# Patient Record
Sex: MALE | Race: BLACK OR AFRICAN AMERICAN | Employment: OTHER | RURAL
[De-identification: names, ages, dates, MRNs, and addresses within clinical notes are randomized per-mention and may not be internally consistent; named-entity substitution may affect disease eponyms.]

---

## 2018-04-25 ENCOUNTER — OFFICE VISIT (OUTPATIENT)
Dept: FAMILY MEDICINE CLINIC | Age: 68
End: 2018-04-25

## 2018-04-25 VITALS
BODY MASS INDEX: 32.27 KG/M2 | OXYGEN SATURATION: 96 % | HEIGHT: 67 IN | WEIGHT: 205.6 LBS | HEART RATE: 78 BPM | TEMPERATURE: 97.2 F | SYSTOLIC BLOOD PRESSURE: 165 MMHG | DIASTOLIC BLOOD PRESSURE: 105 MMHG | RESPIRATION RATE: 14 BRPM

## 2018-04-25 DIAGNOSIS — Z86.11 HISTORY OF TB (TUBERCULOSIS): ICD-10-CM

## 2018-04-25 DIAGNOSIS — E78.5 HYPERLIPIDEMIA, UNSPECIFIED HYPERLIPIDEMIA TYPE: ICD-10-CM

## 2018-04-25 DIAGNOSIS — Z85.46 HISTORY OF PROSTATE CANCER: ICD-10-CM

## 2018-04-25 DIAGNOSIS — I10 ESSENTIAL HYPERTENSION: ICD-10-CM

## 2018-04-25 DIAGNOSIS — J40 BRONCHITIS: Primary | ICD-10-CM

## 2018-04-25 RX ORDER — GLUCOSAMINE SULFATE 1500 MG
POWDER IN PACKET (EA) ORAL DAILY
COMMUNITY

## 2018-04-25 RX ORDER — NEBIVOLOL 10 MG/1
TABLET ORAL DAILY
COMMUNITY
End: 2018-05-31 | Stop reason: SDUPTHER

## 2018-04-25 RX ORDER — HYDROCHLOROTHIAZIDE 12.5 MG/1
12.5 TABLET ORAL DAILY
Qty: 30 TAB | Refills: 1 | Status: SHIPPED | OUTPATIENT
Start: 2018-04-25 | End: 2018-05-31 | Stop reason: SDUPTHER

## 2018-04-25 RX ORDER — AZITHROMYCIN 250 MG/1
TABLET, FILM COATED ORAL
Qty: 6 TAB | Refills: 0 | Status: SHIPPED | OUTPATIENT
Start: 2018-04-25 | End: 2018-04-30

## 2018-04-25 RX ORDER — GUAIFENESIN 600 MG/1
600 TABLET, EXTENDED RELEASE ORAL 2 TIMES DAILY
COMMUNITY
End: 2018-05-31

## 2018-04-25 NOTE — PATIENT INSTRUCTIONS
Continue current medications  Restart HCTZ    Work on diet and exercise    Come in for fasting Lab work    Keep planned follow up visit       For cold  Home, rest, lots of fluids, vaporizer if needed. zithromax x 5 days  Chest X ray  Over the counter mucinex if needed. Follow up if worse or not better next 3-5 days.

## 2018-04-25 NOTE — PROGRESS NOTES
Chief Complaint   Patient presents with    Establish Care     cough X1 wk, chest congestion; taking mucinex     Visit Vitals    BP (!) 167/111 (BP 1 Location: Left arm, BP Patient Position: Sitting)    Pulse 78    Temp 97.2 °F (36.2 °C) (Oral)    Resp 14    Ht 5' 7\" (1.702 m)    Wt 205 lb 9.6 oz (93.3 kg)    SpO2 96%    BMI 32.2 kg/m2     Geoff Zamora LPN

## 2018-04-25 NOTE — PROGRESS NOTES
Carolee Rocha is a 75 y.o. male who presents to the office today with the following:  Chief Complaint   Patient presents with   [facility] Establish Care     cough X1 wk, chest congestion; taking mucinex       Not on File    Current Outpatient Prescriptions   Medication Sig    nebivolol (BYSTOLIC) 10 mg tablet Take  by mouth daily.  cholecalciferol (VITAMIN D3) 1,000 unit cap Take  by mouth daily.  guaiFENesin ER (MUCINEX) 600 mg ER tablet Take 600 mg by mouth two (2) times a day.  azithromycin (ZITHROMAX) 250 mg tablet Take 2 tablets today, then take 1 tablet daily    hydroCHLOROthiazide (HYDRODIURIL) 12.5 mg tablet Take 1 Tab by mouth daily. No current facility-administered medications for this visit. Past Medical History:   Diagnosis Date    Hypertension     Prostate CA ([facility] [address] 2017    Ulcer, stomach peptic, acute        Past Surgical History:   Procedure Laterality Date    HX ACL RECONSTRUCTION Left        History   Smoking Status    Never Smoker   Smokeless Tobacco    Never Used       Family History   Problem Relation Age of Onset    Heart Disease Mother     No Known Problems Father     Heart Disease Sister     Hypertension Sister     Cancer Brother     Hypertension Brother     Heart Disease Sister     Heart Disease Sister     Hypertension Brother     Heart Disease Brother     Hypertension Brother          History of Present Illness:  Patient here for evaluation of cough and to establish in this practice    Patient states over the last week he has had URI complaints. Initially nasal congestion and postnasal drip with clear rhinorrhea. Now cough and some chest congestion. He has been taking some Mucinex DM. He has had no fever no chills no shortness of breath. He has noted some occasional wheezing. He does not smoke. No history of COPD. Patient does have a history of tuberculosis. He was treated initially in the 76s after being in [facility].  He had recurrence in 2005.   He was hospitalized at that time. He follow with pulmonology. He was treated and was felt to be cured. He has not had any ongoing respiratory issues. He did have an inhaler to be used at one point in time years ago but has never needed. Patient does have a history of hypertension. He continues with his Bystolic. Blood pressure was elevated today. He states previously it was in the 130/90 range. He was on hydrochlorothiazide in the past but stopped this on his own. He would be willing to restart this. He denies any other cardiac history. No MI CAD CHF A. fib. He has no cardiac complaints. He is going to have some screening lab work. Patient believes he has hyperlipidemia but is currently not being treated. He is willing to have some lab work    No history of diabetes. Patient does have elevated BMI. He is aware of the importance of diet and exercise for weight loss. He has DJD and has had knee surgery in the past.  He does walk with a cane    Patient has a history of prostate cancer. This is been treated with radiation. He did see his urologist prior to moving here in December 2017 lab work at that time apparently was normal.  No evidence of recurrence. Patient states his past medical history otherwise negative      Review of Systems:    Review of systems negative except as noted above      Physical Exam:  Visit Vitals    BP (!) 165/105    Pulse 78    Temp 97.2 °F (36.2 °C) (Oral)    Resp 14    Ht 5' 7\" (1.702 m)    Wt 205 lb 9.6 oz (93.3 kg)    SpO2 96%    BMI 32.2 kg/m2     Vitals:    04/25/18 1340 04/25/18 1428   BP: (!) 167/111 (!) 165/105   BP 1 Location: Left arm    BP Patient Position: Sitting    Pulse: 78    Resp: 14    Temp: 97.2 °F (36.2 °C)    TempSrc: Oral    SpO2: 96%    Weight: 205 lb 9.6 oz (93.3 kg)    Height: 5' 7\" (1.702 m)      Patient no acute distress vitals as above on repeat.   O2 sat was normal afebrile  Head was normocephalic  External ears were normal. Ear canals normal.  TMs were clear  Nose external nose normal.  No lesions. Plus   congestion with clear white rhinorrhea  OP Mucosa normal.  Pharynx normal.  No erythema or exudate. Structures midline  Neck no nodes no masses  Chest is clear no wheezes rhonchi or rales. Good air exchange. Patient did have an intermittent harsh cough  Cor regular rate and rhythm no murmurs  No edema    Assessment/Plan:  1. Bronchitis  Given persistence of symptoms I will treat patient with Zithromax. Given his history of TB checking a chest x-ray on him. He is in a follow-up getting worse or not better over the next several days. He may continue with Mucinex DM  - XR CHEST PA LAT; Future  - azithromycin (ZITHROMAX) 250 mg tablet; Take 2 tablets today, then take 1 tablet daily  Dispense: 6 Tab; Refill: 0    2. History of TB (tuberculosis)    - XR CHEST PA LAT; Future    3. Essential hypertension  Blood pressure significantly elevated today. He did forget to take his Bystolic this morning but his pressures are high enough I think we need to restart his hydrochlorothiazide. Bringing him back for fasting lab work. I am bringing him back in 3 weeks for recheck and review of the above  - CBC WITH AUTOMATED DIFF; Future  - METABOLIC PANEL, COMPREHENSIVE; Future  - TSH 3RD GENERATION; Future  - URINALYSIS W/ RFLX MICROSCOPIC; Future  - hydroCHLOROthiazide (HYDRODIURIL) 12.5 mg tablet; Take 1 Tab by mouth daily. Dispense: 30 Tab; Refill: 1    4. Hyperlipidemia, unspecified hyperlipidemia type  Checking lab work  - LIPID PANEL; Future    5. BMI 32.0-32.9,adult  Importance of diet and exercise stressed    6. History of prostate cancer  Post radiation in remission per patient    Patient Instructions   Continue current medications  Restart HCTZ    Work on diet and exercise    Come in for fasting Lab work    Keep planned follow up visit       For cold  Home, rest, lots of fluids, vaporizer if needed.   zithromax x 5 days  Chest X ray  Over the counter mucinex if needed. Follow up if worse or not better next 3-5 days. Continue current therapy plan except for indicated above. Verbal and written instructions (see AVS) provided.  Patient expresses understanding of diagnosis and treatment plan. Follow-up Disposition:  Return in about 3 weeks (around 5/16/2018). Gretchen Lipscomb.  Racheal Rodríguez MD

## 2018-04-25 NOTE — MR AVS SNAPSHOT
[facility] [address] 
[phone] Patient: Merline Mary MRN: [id number] :1950 Visit Information Date & Time Provider Department Dept. Phone Encounter #  
 2018  1:00 PM Livan Wesley  [facility] [phone] [id number] Follow-up Instructions Return in about 3 weeks (around 2018). Your Appointments 2018  3:20 PM  
Any with Livan Wesley MD  
[facility]) Appt Note: NP/Est Pcp/CPE  
 [phone] [phone]  
[phone]  
  
   
 [address] [address] Upcoming Health Maintenance Date Due Hepatitis C Screening 1950 DTaP/Tdap/Td series (1 - Tdap) 1970 FOBT Q 1 YEAR AGE 50-75 1999 ZOSTER VACCINE AGE 60> 2009 GLAUCOMA SCREENING Q2Y 2014 Pneumococcal 65+ Low/Medium Risk (1 of 2 - PCV13) 2014 Influenza Age 5 to Adult 2017 MEDICARE YEARLY EXAM 2018 Allergies as of 2018  Review Complete On: 2018 By: Livan Wesley MD  
 Not on File Current Immunizations  Never Reviewed No immunizations on file. Not reviewed this visit You Were Diagnosed With   
  
 Codes Comments Bronchitis    -  Primary ICD-10-CM: J72 ICD-9-CM: 220 History of TB (tuberculosis)     ICD-10-CM: Z86.11 
ICD-9-CM: V12.01 Essential hypertension     ICD-10-CM: I10 
ICD-9-CM: 401.9 Hyperlipidemia, unspecified hyperlipidemia type     ICD-10-CM: E78.5 ICD-9-CM: 272.4 BMI 32.0-32.9,adult     ICD-10-CM: H37.57 
ICD-9-CM: V85.32 History of prostate cancer     ICD-10-CM: Z85.46 
ICD-9-CM: V10.46 Vitals BP Pulse Temp Resp Height(growth percentile) Weight(growth percentile)  (!) 167/111 (BP 1 Location: Left arm, BP Patient Position: Sitting) 78 97.2 °F (36.2 °C) (Oral) 14 5' 7\" (1.702 m) 205 lb 9.6 oz (93.3 kg) SpO2 BMI Smoking Status 96% 32.2 kg/m2 Never Smoker BMI and BSA Data Body Mass Index Body Surface Area  
 32.2 kg/m 2 2.1 m 2 Preferred Pharmacy Pharmacy Name Phone [address] [place] [address],[address] [place] [address]  [phone] Your Updated Medication List  
  
   
This list is accurate as of 4/25/18  2:26 PM.  Always use your most recent med list.  
  
  
  
  
 azithromycin 250 mg tablet Commonly known as:  Graciela Cookey Take 2 tablets today, then take 1 tablet daily BYSTOLIC 10 mg tablet Generic drug:  nebivolol Take  by mouth daily. hydroCHLOROthiazide 12.5 mg tablet Commonly known as:  HYDRODIURIL Take 1 Tab by mouth daily. MUCINEX 600 mg ER tablet Generic drug:  guaiFENesin ER Take 600 mg by mouth two (2) times a day. VITAMIN D3 1,000 unit Cap Generic drug:  cholecalciferol Take  by mouth daily. Prescriptions Sent to Pharmacy Refills  
 azithromycin (ZITHROMAX) 250 mg tablet 0 Sig: Take 2 tablets today, then take 1 tablet daily Class: Normal  
 Pharmacy: [address] [place] [address] Dr Ph #: [phone]  
 hydroCHLOROthiazide (HYDRODIURIL) 12.5 mg tablet 1 Sig: Take 1 Tab by mouth daily. Class: Normal  
 Pharmacy: [address] [place] [address] Dr Ph #: [phone] Route: Oral  
  
Follow-up Instructions Return in about 3 weeks (around 5/16/2018). To-Do List   
 05/02/2018 Imaging:  XR CHEST PA LAT   
  
 05/25/2018 Lab:  CBC WITH AUTOMATED DIFF   
  
 05/25/2018 Lab:  LIPID PANEL   
  
 05/25/2018 Lab:  METABOLIC PANEL, COMPREHENSIVE   
  
 05/25/2018 Lab:  TSH 3RD GENERATION   
  
 05/25/2018 Lab:  URINALYSIS W/ RFLX MICROSCOPIC Patient Instructions Continue current medications Restart HCTZ Work on diet and exercise Come in for fasting Lab work Keep planned follow up visit For cold Home, rest, lots of fluids, vaporizer if needed. zithromax x 5 days Chest X ray Over the counter mucinex if needed. Follow up if worse or not better next 3-5 days. Introducing [facility]! [facility] introduces [employer] patient portal. Now you can access parts of your medical record, email your doctor's office, and request medication refills online. 1. In your internet browser, go to [email or web address] 2. Click on the First Time User? Click Here link in the Sign In box. You will see the New Member Sign Up page. 3. Enter your [employer] Access Code exactly as it appears below. You will not need to use this code after youve completed the sign-up process. If you do not sign up before the expiration date, you must request a new code. · [employer] Access Code: [email or web address] Expires: 7/24/2018  1:16 PM 
 
4. Enter the last four digits of your Social Security Number (xxxx) and Date of Birth (mm/dd/yyyy) as indicated and click Submit. You will be taken to the next sign-up page. 5. Create a [employer] ID. This will be your [employer] login ID and cannot be changed, so think of one that is secure and easy to remember. 6. Create a [employer] password. You can change your password at any time. 7. Enter your Password Reset Question and Answer. This can be used at a later time if you forget your password. 8. Enter your e-mail address. You will receive e-mail notification when new information is available in [address] 9. Click Sign Up. You can now view and download portions of your medical record. 10. Click the Download Summary menu link to download a portable copy of your medical information. If you have questions, please visit the Frequently Asked Questions section of the [employer] website. Remember, [employer] is NOT to be used for urgent needs. For medical emergencies, dial 911. Now available from your iPhone and Android! Please provide this summary of care documentation to your next provider. If you have any questions after today's visit, please call [phone].

## 2018-05-08 DIAGNOSIS — Z86.11 HISTORY OF TB (TUBERCULOSIS): ICD-10-CM

## 2018-05-08 DIAGNOSIS — J40 BRONCHITIS: ICD-10-CM

## 2018-05-15 ENCOUNTER — CLINICAL SUPPORT (OUTPATIENT)
Dept: FAMILY MEDICINE CLINIC | Age: 68
End: 2018-05-15

## 2018-05-15 DIAGNOSIS — I10 ESSENTIAL HYPERTENSION: ICD-10-CM

## 2018-05-15 DIAGNOSIS — E78.5 HYPERLIPIDEMIA, UNSPECIFIED HYPERLIPIDEMIA TYPE: ICD-10-CM

## 2018-05-16 LAB
ALBUMIN SERPL-MCNC: 3.9 G/DL (ref 3.6–4.8)
ALBUMIN/GLOB SERPL: 1.4 {RATIO} (ref 1.2–2.2)
ALP SERPL-CCNC: 69 IU/L (ref 39–117)
ALT SERPL-CCNC: 17 IU/L (ref 0–44)
APPEARANCE UR: CLEAR
AST SERPL-CCNC: 16 IU/L (ref 0–40)
BASOPHILS # BLD AUTO: 0 X10E3/UL (ref 0–0.2)
BASOPHILS NFR BLD AUTO: 0 %
BILIRUB SERPL-MCNC: 0.4 MG/DL (ref 0–1.2)
BILIRUB UR QL STRIP: NEGATIVE
BUN SERPL-MCNC: 14 MG/DL (ref 8–27)
BUN/CREAT SERPL: 14 (ref 10–24)
CALCIUM SERPL-MCNC: 9 MG/DL (ref 8.6–10.2)
CHLORIDE SERPL-SCNC: 102 MMOL/L (ref 96–106)
CHOLEST SERPL-MCNC: 205 MG/DL (ref 100–199)
CO2 SERPL-SCNC: 23 MMOL/L (ref 18–29)
COLOR UR: YELLOW
CREAT SERPL-MCNC: 0.99 MG/DL (ref 0.76–1.27)
EOSINOPHIL # BLD AUTO: 0.1 X10E3/UL (ref 0–0.4)
EOSINOPHIL NFR BLD AUTO: 2 %
ERYTHROCYTE [DISTWIDTH] IN BLOOD BY AUTOMATED COUNT: 15 % (ref 12.3–15.4)
GFR SERPLBLD CREATININE-BSD FMLA CKD-EPI: 78 ML/MIN/1.73
GFR SERPLBLD CREATININE-BSD FMLA CKD-EPI: 90 ML/MIN/1.73
GLOBULIN SER CALC-MCNC: 2.7 G/DL (ref 1.5–4.5)
GLUCOSE SERPL-MCNC: 104 MG/DL (ref 65–99)
GLUCOSE UR QL: NEGATIVE
HCT VFR BLD AUTO: 40 % (ref 37.5–51)
HDLC SERPL-MCNC: 55 MG/DL
HGB BLD-MCNC: 13.6 G/DL (ref 13–17.7)
HGB UR QL STRIP: NEGATIVE
IMM GRANULOCYTES # BLD: 0 X10E3/UL (ref 0–0.1)
IMM GRANULOCYTES NFR BLD: 0 %
INTERPRETATION, 910389: NORMAL
KETONES UR QL STRIP: NEGATIVE
LDLC SERPL CALC-MCNC: 131 MG/DL (ref 0–99)
LEUKOCYTE ESTERASE UR QL STRIP: NEGATIVE
LYMPHOCYTES # BLD AUTO: 1.4 X10E3/UL (ref 0.7–3.1)
LYMPHOCYTES NFR BLD AUTO: 34 %
MCH RBC QN AUTO: 27.5 PG (ref 26.6–33)
MCHC RBC AUTO-ENTMCNC: 34 G/DL (ref 31.5–35.7)
MCV RBC AUTO: 81 FL (ref 79–97)
MICRO URNS: NORMAL
MONOCYTES # BLD AUTO: 0.4 X10E3/UL (ref 0.1–0.9)
MONOCYTES NFR BLD AUTO: 9 %
NEUTROPHILS # BLD AUTO: 2.2 X10E3/UL (ref 1.4–7)
NEUTROPHILS NFR BLD AUTO: 55 %
NITRITE UR QL STRIP: NEGATIVE
PH UR STRIP: 6 [PH] (ref 5–7.5)
PLATELET # BLD AUTO: 204 X10E3/UL (ref 150–379)
POTASSIUM SERPL-SCNC: 3.8 MMOL/L (ref 3.5–5.2)
PROT SERPL-MCNC: 6.6 G/DL (ref 6–8.5)
PROT UR QL STRIP: NEGATIVE
RBC # BLD AUTO: 4.94 X10E6/UL (ref 4.14–5.8)
SODIUM SERPL-SCNC: 140 MMOL/L (ref 134–144)
SP GR UR: 1.02 (ref 1–1.03)
TRIGL SERPL-MCNC: 95 MG/DL (ref 0–149)
TSH SERPL DL<=0.005 MIU/L-ACNC: 2.55 UIU/ML (ref 0.45–4.5)
UROBILINOGEN UR STRIP-MCNC: 1 MG/DL (ref 0.2–1)
VLDLC SERPL CALC-MCNC: 19 MG/DL (ref 5–40)
WBC # BLD AUTO: 4.1 X10E3/UL (ref 3.4–10.8)

## 2018-05-16 NOTE — PROGRESS NOTES
Labs reviewed.   Please notify patient   CBC, chemistry panel, thyroid functions and urinalysis all normal  Lipids mildly elevated with an LDL of 131  Patient should continue his current medications  Work on a low-cholesterol diet  He should keep plan follow-up with me the end of the month for recheck of his blood pressure and to discuss his lipids and treatment options

## 2018-05-31 ENCOUNTER — OFFICE VISIT (OUTPATIENT)
Dept: FAMILY MEDICINE CLINIC | Age: 68
End: 2018-05-31

## 2018-05-31 VITALS
TEMPERATURE: 97.8 F | RESPIRATION RATE: 16 BRPM | SYSTOLIC BLOOD PRESSURE: 144 MMHG | HEART RATE: 69 BPM | DIASTOLIC BLOOD PRESSURE: 86 MMHG | WEIGHT: 208.4 LBS | HEIGHT: 67 IN | BODY MASS INDEX: 32.71 KG/M2 | OXYGEN SATURATION: 99 %

## 2018-05-31 DIAGNOSIS — Z23 ENCOUNTER FOR IMMUNIZATION: ICD-10-CM

## 2018-05-31 DIAGNOSIS — K21.9 GASTROESOPHAGEAL REFLUX DISEASE, ESOPHAGITIS PRESENCE NOT SPECIFIED: ICD-10-CM

## 2018-05-31 DIAGNOSIS — Z98.890 HISTORY OF LEFT KNEE SURGERY: ICD-10-CM

## 2018-05-31 DIAGNOSIS — E78.5 HYPERLIPIDEMIA, UNSPECIFIED HYPERLIPIDEMIA TYPE: ICD-10-CM

## 2018-05-31 DIAGNOSIS — I10 ESSENTIAL HYPERTENSION: Primary | ICD-10-CM

## 2018-05-31 RX ORDER — RANITIDINE 150 MG/1
TABLET, FILM COATED ORAL
Qty: 30 TAB | Refills: 5 | Status: SHIPPED | OUTPATIENT
Start: 2018-05-31 | End: 2018-08-30 | Stop reason: DRUGHIGH

## 2018-05-31 RX ORDER — HYDROCHLOROTHIAZIDE 12.5 MG/1
12.5 TABLET ORAL DAILY
Qty: 30 TAB | Refills: 5 | Status: SHIPPED | OUTPATIENT
Start: 2018-05-31 | End: 2018-08-30 | Stop reason: DRUGHIGH

## 2018-05-31 RX ORDER — NEBIVOLOL 10 MG/1
10 TABLET ORAL DAILY
Qty: 30 TAB | Refills: 5 | Status: SHIPPED | OUTPATIENT
Start: 2018-05-31 | End: 2018-08-30 | Stop reason: SDUPTHER

## 2018-05-31 RX ORDER — ATORVASTATIN CALCIUM 10 MG/1
10 TABLET, FILM COATED ORAL DAILY
Qty: 30 TAB | Refills: 5 | Status: SHIPPED | OUTPATIENT
Start: 2018-05-31 | End: 2018-08-23 | Stop reason: SDUPTHER

## 2018-05-31 NOTE — PATIENT INSTRUCTIONS
Continue current medications add   Zantac 1 in the morning  Add atorvastatin one in the morning  May still use Tums if needed      Work on diet and exercise    Keep planned follow up visit      Learning About High Cholesterol  What is high cholesterol? Cholesterol is a type of fat in your blood. It is needed for many body functions, such as making new cells. Cholesterol is made by your body. It also comes from food you eat. If you have too much cholesterol, it starts to build up in your arteries. This is called hardening of the arteries, or atherosclerosis. High cholesterol raises your risk of a heart attack and stroke. There are different types of cholesterol. LDL is the \"bad\" cholesterol. High LDL can raise your risk for heart disease, heart attack, and stroke. HDL is the \"good\" cholesterol. High HDL is linked with a lower risk for heart disease, heart attack, and stroke. Your cholesterol levels help your doctor find out your risk for having a heart attack or stroke. How can you prevent high cholesterol? A heart-healthy lifestyle can help you prevent high cholesterol. This lifestyle helps lower your risk for a heart attack and stroke. · Eat heart-healthy foods. ¨ Eat fruits, vegetables, whole grains (like oatmeal), dried beans and peas, nuts and seeds, soy products (like tofu), and fat-free or low-fat dairy products. ¨ Replace butter, margarine, and hydrogenated or partially hydrogenated oils with olive and canola oils. (Canola oil margarine without trans fat is fine.)  ¨ Replace red meat with fish, poultry, and soy protein (like tofu). ¨ Limit processed and packaged foods like chips, crackers, and cookies. · Be active. Exercise can improve your cholesterol level. Get at least 30 minutes of exercise on most days of the week. Walking is a good choice. You also may want to do other activities, such as running, swimming, cycling, or playing tennis or team sports. · Stay at a healthy weight.  Lose weight if you need to. · Don't smoke. If you need help quitting, talk to your doctor about stop-smoking programs and medicines. These can increase your chances of quitting for good. How is high cholesterol treated? The goal of treatment is to reduce your chances of having a heart attack or stroke. The goal is not to lower your cholesterol numbers only. · You may make lifestyle changes, such as eating healthy foods, not smoking, losing weight, and being more active. · You may have to take medicine. Follow-up care is a key part of your treatment and safety. Be sure to make and go to all appointments, and call your doctor if you are having problems. It's also a good idea to know your test results and keep a list of the medicines you take. Where can you learn more? Go to http://lin-sandra.info/. Enter Z403 in the search box to learn more about \"Learning About High Cholesterol. \"  Current as of: September 21, 2016  Content Version: 11.4  © 8095-7645 [employer]. Care instructions adapted under license by [employer] (which disclaims liability or warranty for this information). If you have questions about a medical condition or this instruction, always ask your healthcare professional. [place] [address] any warranty or liability for your use of this information.

## 2018-05-31 NOTE — MR AVS SNAPSHOT
[facility] 
[phone] Patient: James Steward MRN: [id number] :1950 Visit Information Date & Time Provider Department Dept. Phone Encounter #  
 2018  7:20 AM Michael Dexter MD [address] [place] [address] [phone] [id number] Follow-up Instructions Return in about 3 months (around 2018). Upcoming Health Maintenance Date Due Hepatitis C Screening 1950 DTaP/Tdap/Td series (1 - Tdap) 1970 ZOSTER VACCINE AGE 60> 2009 GLAUCOMA SCREENING Q2Y 2014 Pneumococcal 65+ Low/Medium Risk (1 of 2 - PCV13) 2014 MEDICARE YEARLY EXAM 2018 Influenza Age 5 to Adult 2018 COLONOSCOPY 2024 Allergies as of 2018  Review Complete On: 2018 By: Michael Dexter MD  
 No Known Allergies Current Immunizations  Never Reviewed Name Date Pneumococcal Polysaccharide (PPSV-23)  Incomplete Not reviewed this visit You Were Diagnosed With   
  
 Codes Comments Essential hypertension    -  Primary ICD-10-CM: I10 
ICD-9-CM: 401.9 Hyperlipidemia, unspecified hyperlipidemia type     ICD-10-CM: E78.5 ICD-9-CM: 272.4 BMI 32.0-32.9,adult     ICD-10-CM: I50.37 
ICD-9-CM: V85.32 Gastroesophageal reflux disease, esophagitis presence not specified     ICD-10-CM: K21.9 ICD-9-CM: 530.81 History of left knee surgery     ICD-10-CM: Z98.890 ICD-9-CM: V15.29 Encounter for immunization     ICD-10-CM: C47 ICD-9-CM: V03.89 Vitals BP Pulse Temp Resp Height(growth percentile) Weight(growth percentile) 144/86 (BP 1 Location: Left arm, BP Patient Position: Sitting) 69 97.8 °F (36.6 °C) (Oral) 16 5' 7\" (1.702 m) 208 lb 6.4 oz (94.5 kg) SpO2 BMI Smoking Status 99% 32.64 kg/m2 Never Smoker Vitals History BMI and BSA Data Body Mass Index Body Surface Area 32.64 kg/m 2 2.11 m 2 Preferred Pharmacy Pharmacy Name Phone [facility] Merna Quinn [phone] Your Updated Medication List  
  
   
This list is accurate as of 18  8:31 AM.  Always use your most recent med list.  
  
  
  
  
 atorvastatin 10 mg tablet Commonly known as:  LIPITOR Take 1 Tab by mouth daily. hydroCHLOROthiazide 12.5 mg tablet Commonly known as:  HYDRODIURIL Take 1 Tab by mouth daily. nebivolol 10 mg tablet Commonly known as:  BYSTOLIC Take 1 Tab by mouth daily. raNITIdine 150 mg tablet Commonly known as:  ZANTAC  
1 tablet daily VITAMIN D3 1,000 unit Cap Generic drug:  cholecalciferol Take  by mouth daily. Prescriptions Sent to Pharmacy Refills  
 nebivolol (BYSTOLIC) 10 mg tablet 5 Sig: Take 1 Tab by mouth daily. Class: Normal  
 Pharmacy: [facility] DR SANTIAGO DAVIDSON [facility], [facility] Pawan Brenner Ph #: [phone] Route: Oral  
 hydroCHLOROthiazide (HYDRODIURIL) 12.5 mg tablet 5 Sig: Take 1 Tab by mouth daily. Class: Normal  
 Pharmacy: [facility] DR SANTIAGO DAVIDSON [facility], [facility] Pawan Brenner Ph #: [phone] Route: Oral  
 raNITIdine (ZANTAC) 150 mg tablet 5 Si tablet daily Class: Normal  
 [facility] [phone]  
 atorvastatin (LIPITOR) 10 mg tablet 5 Sig: Take 1 Tab by mouth daily. Class: Normal  
 Pharmacy: [facility] DR SANTIAGO DAVIDSON [facility], [address] [place], [place] [address]jennifer Brenner Ph #: [phone] Route: Oral  
  
We Performed the Following AMB POC EKG ROUTINE  LEADS, INTER & REP [72024 CPT(R)] PNEUMOCOCCAL POLYSACCHARIDE VACCINE, 23-VALENT, ADULT OR IMMUNOSUPPRESSED PT DOSE, [39908 CPT(R)] Follow-up Instructions Return in about 3 months (around 2018). Patient Instructions Continue current medications add  
Zantac 1 in the morning Add atorvastatin one in the morning May still use Tums if needed Work on diet and exercise Keep planned follow up visit Learning About High Cholesterol What is high cholesterol? Cholesterol is a type of fat in your blood. It is needed for many body functions, such as making new cells. Cholesterol is made by your body. It also comes from food you eat. If you have too much cholesterol, it starts to build up in your arteries. This is called hardening of the arteries, or atherosclerosis. High cholesterol raises your risk of a heart attack and stroke. There are different types of cholesterol. LDL is the \"bad\" cholesterol. High LDL can raise your risk for heart disease, heart attack, and stroke. HDL is the \"good\" cholesterol. High HDL is linked with a lower risk for heart disease, heart attack, and stroke. Your cholesterol levels help your doctor find out your risk for having a heart attack or stroke. How can you prevent high cholesterol? A heart-healthy lifestyle can help you prevent high cholesterol. This lifestyle helps lower your risk for a heart attack and stroke. · Eat heart-healthy foods. ¨ Eat fruits, vegetables, whole grains (like oatmeal), dried beans and peas, nuts and seeds, soy products (like tofu), and fat-free or low-fat dairy products. ¨ Replace butter, margarine, and hydrogenated or partially hydrogenated oils with olive and canola oils. (Canola oil margarine without trans fat is fine.) ¨ Replace red meat with fish, poultry, and soy protein (like tofu). ¨ Limit processed and packaged foods like chips, crackers, and cookies. · Be active. Exercise can improve your cholesterol level. Get at least 30 minutes of exercise on most days of the week. Walking is a good choice. You also may want to do other activities, such as running, swimming, cycling, or playing tennis or team sports. · Stay at a healthy weight. Lose weight if you need to. · Don't smoke. If you need help quitting, talk to your doctor about stop-smoking programs and medicines. These can increase your chances of quitting for good. How is high cholesterol treated? The goal of treatment is to reduce your chances of having a heart attack or stroke. The goal is not to lower your cholesterol numbers only. · You may make lifestyle changes, such as eating healthy foods, not smoking, losing weight, and being more active. · You may have to take medicine. Follow-up care is a key part of your treatment and safety. Be sure to make and go to all appointments, and call your doctor if you are having problems. It's also a good idea to know your test results and keep a list of the medicines you take. Where can you learn more? Go to http://lin-sandra.info/. Enter R781 in the search box to learn more about \"Learning About High Cholesterol. \" Current as of: September 21, 2016 Content Version: 11.4 © 4465-1720 10X10 Room. Care instructions adapted under license by [employer] (which disclaims liability or warranty for this information). If you have questions about a medical condition or this instruction, always ask your healthcare professional. [address] any warranty or liability for your use of this information. Introducing [facility] & HEALTH SERVICES! [facility] introduces Member Desk patient portal. Now you can access parts of your medical record, email your doctor's office, and request medication refills online. 1. In your internet browser, go to [email or web address] 2. Click on the First Time User? Click Here link in the Sign In box. You will see the New Member Sign Up page. 3. Enter your Member Desk Access Code exactly as it appears below. You will not need to use this code after youve completed the sign-up process. If you do not sign up before the expiration date, you must request a new code. · [employer] Access Code: [email or web address] Expires: 7/24/2018  1:16 PM 
 
4. Enter the last four digits of your Social Security Number (xxxx) and Date of Birth (mm/dd/yyyy) as indicated and click Submit. You will be taken to the next sign-up page. 5. Create a [employer] ID. This will be your [employer] login ID and cannot be changed, so think of one that is secure and easy to remember. 6. Create a [employer] password. You can change your password at any time. 7. Enter your Password Reset Question and Answer. This can be used at a later time if you forget your password. 8. Enter your e-mail address. You will receive e-mail notification when new information is available in [address] 9. Click Sign Up. You can now view and download portions of your medical record. 10. Click the Download Summary menu link to download a portable copy of your medical information. If you have questions, please visit the Frequently Asked Questions section of the [employer] website. Remember, [employer] is NOT to be used for urgent needs. For medical emergencies, dial 911. Now available from your iPhone and Android! Please provide this summary of care documentation to your next provider. If you have any questions after today's visit, please call [phone].

## 2018-05-31 NOTE — PROGRESS NOTES
Otto Owens is a 75 y.o. male who presents to the office today with the following:  Chief Complaint   Patient presents with    Hypertension     f/u       No Known Allergies    Current Outpatient Prescriptions   Medication Sig    nebivolol (BYSTOLIC) 10 mg tablet Take 1 Tab by mouth daily.  hydroCHLOROthiazide (HYDRODIURIL) 12.5 mg tablet Take 1 Tab by mouth daily.  raNITIdine (ZANTAC) 150 mg tablet 1 tablet daily    atorvastatin (LIPITOR) 10 mg tablet Take 1 Tab by mouth daily.  cholecalciferol (VITAMIN D3) 1,000 unit cap Take  by mouth daily. No current facility-administered medications for this visit. Past Medical History:   Diagnosis Date    Hypertension     Prostate CA ([address] 2017    Ulcer, stomach peptic, acute        Past Surgical History:   Procedure Laterality Date    HX ACL RECONSTRUCTION Left        History   Smoking Status    Never Smoker   Smokeless Tobacco    Never Used       Family History   Problem Relation Age of Onset    Heart Disease Mother     No Known Problems Father     Heart Disease Sister     Hypertension Sister     Cancer Brother      prostate    Hypertension Brother     Heart Disease Sister     Heart Disease Sister     Hypertension Brother     Heart Disease Brother     Hypertension Brother          History of Present Illness:  Patient here for follow-up from visit 4/25 and ongoing medical follow-up    I saw the patient last month with complaints of cough and congestion. Chest x-ray did show a mild patchy right upper lobe infiltrate concerning for pneumonia. He was treated with Zithromax. Symptoms resolved. He has no further respiratory complaints    Patient does have a history of tuberculosis. He was treated initially in the 76s after being in [facility].  He had recurrence in 2005. He was hospitalized at that time. He follow with pulmonology. He was treated and was felt to be cured. He has not had any ongoing respiratory issues.   He did have an inhaler to be used at one point in time years ago but has never needed. Patient does have a history of hypertension. He continues with his Bystolic. Blood pressure was elevated at the last visit and I did add in low-dose hydrochlorothiazide. He is tolerating it well. Blood pressure is improved today. He denies any other cardiac history. No MI CAD CHF A. fib. He has no cardiac complaints. He has had a history of peptic ulcer disease and ongoing GERD complaints so not a good candidate for aspirin at this point. Recent lab work reveals normal basic metabolic profile thyroid functions urinalysis and CBC. Patient states his previous physician did have him seen by cardiology. He has had a negative stress test in the past.  He just saw cardiology within the last year and a felt things were stable and made no changes in his regimen. Patient does have hyperlipidemia with an LDL of 131. Given his 10 year cardiac risk statin therapy is recommended. Risks and benefits of atorvastatin discussed with patient and he is willing to begin this today. I have given him a low-cholesterol diet    No history of diabetes. Random BG normal    Patient does have elevated BMI. He is aware of the importance of diet and exercise for weight loss. He has DJD and a history of meniscal injury and ACL injury left knee. He has had 2 knee surgeries in the past.  These were complicated. He does continue to use a cane and walk with a limp. He does continue to follow with orthopedist.  He recently had another cortisone shot    Patient has a history of peptic ulcer disease in the past.  He was treated at that time. He does have some ongoing reflux symptoms mostly after he eats certain food or drink soda. No exertional symptoms. He used to take Zantac. Now he is taking Tums which she uses on a daily basis. He is willing to restart his Zantac    Patient has a history of prostate cancer.   This is been treated with radiation. He did see his urologist prior to moving here in December 2017 lab work at that time apparently was normal.  No evidence of recurrence. Patient states his past medical history otherwise negative    Patient states he had a negative colonoscopy in 2014 and was told to go 10 years for his next scope    Patient has had the Prevnar in the past.  He was given the Pneumovax 23 today. I did recommend the shingles vaccine at his pharmacy. He will come in if he injures himself for a tetanus booster      Review of Systems:    Review of systems negative except as noted above      Physical Exam:  Visit Vitals    /86 (BP 1 Location: Left arm, BP Patient Position: Sitting)    Pulse 69    Temp 97.8 °F (36.6 °C) (Oral)    Resp 16    Ht 5' 7\" (1.702 m)    Wt 208 lb 6.4 oz (94.5 kg)    SpO2 99%    BMI 32.64 kg/m2     Vitals:    05/31/18 0712   BP: 144/86   BP 1 Location: Left arm   BP Patient Position: Sitting   Pulse: 69   Resp: 16   Temp: 97.8 °F (36.6 °C)   TempSrc: Oral   SpO2: 99%   Weight: 208 lb 6.4 oz (94.5 kg)   Height: 5' 7\" (1.702 m)     Patient no acute distress vital signs stable as above on repeat blood pressure was 140/90  Head is normocephalic  External ears normal..    Eyes PERRLA. EOMs full. Sclera clear Patient states it has been 2 years since he saw his eye doctor and is scheduling this appointment  Nose within normal limits. Nares  normal.  No significant congestion  OP mucosa normal, no obvious lesions. Pharynx normal.  No erythema or exudate. Structures midline. Neck no nodes no masses no bruits  Chest was clear no wheezes rhonchi or rales. Good air exchange  Cor regular rate and rhythm no S3-S4 no murmurs  Abdomen obese no HSM no masses soft and was nontender  Extremities no edema. Nontender. Good range of motion  Patient did walk with a limp favoring his left knee  EKG had normal sinus rhythm with some nonspecific T-wave flattening lateral leads      1.  Essential hypertension  Blood pressure improved and approaching goal.  Patients can continue his current medication. He would like to see what he can do with his diet and exercise to further reduce his systolic blood pressures. I like see him back in 3 months for follow-up. - AMB POC EKG ROUTINE W/ 12 LEADS, INTER & REP  - nebivolol (BYSTOLIC) 10 mg tablet; Take 1 Tab by mouth daily. Dispense: 30 Tab; Refill: 5  - hydroCHLOROthiazide (HYDRODIURIL) 12.5 mg tablet; Take 1 Tab by mouth daily. Dispense: 30 Tab; Refill: 5    2. Hyperlipidemia, unspecified hyperlipidemia type  Starting atorvastatin 1 daily. Patient will work on his diet. We will recheck lab work at his follow-up visit  - atorvastatin (LIPITOR) 10 mg tablet; Take 1 Tab by mouth daily. Dispense: 30 Tab; Refill: 5    3. BMI 32.0-32.9,adult  Importance of diet and exercise stressed    4. Gastroesophageal reflux disease, esophagitis presence not specified  We will start daily ranitidine  - raNITIdine (ZANTAC) 150 mg tablet; 1 tablet daily  Dispense: 30 Tab; Refill: 5    5. History of left knee surgery  Patient will continue follow-up with his orthopedist    6. Encounter for immunization    - PNEUMOCOCCAL POLYSACCHARIDE VACCINE, 23-VALENT, ADULT OR IMMUNOSUPPRESSED PT DOSE,      Patient Instructions   Continue current medications add   Zantac 1 in the morning  Add atorvastatin one in the morning  May still use Tums if needed      Work on diet and exercise    Keep planned follow up visit      Learning About High Cholesterol  What is high cholesterol? Cholesterol is a type of fat in your blood. It is needed for many body functions, such as making new cells. Cholesterol is made by your body. It also comes from food you eat. If you have too much cholesterol, it starts to build up in your arteries. This is called hardening of the arteries, or atherosclerosis. High cholesterol raises your risk of a heart attack and stroke. There are different types of cholesterol. LDL is the \"bad\" cholesterol. High LDL can raise your risk for heart disease, heart attack, and stroke. HDL is the \"good\" cholesterol. High HDL is linked with a lower risk for heart disease, heart attack, and stroke. Your cholesterol levels help your doctor find out your risk for having a heart attack or stroke. How can you prevent high cholesterol? A heart-healthy lifestyle can help you prevent high cholesterol. This lifestyle helps lower your risk for a heart attack and stroke. · Eat heart-healthy foods. ¨ Eat fruits, vegetables, whole grains (like oatmeal), dried beans and peas, nuts and seeds, soy products (like tofu), and fat-free or low-fat dairy products. ¨ Replace butter, margarine, and hydrogenated or partially hydrogenated oils with olive and canola oils. (Canola oil margarine without trans fat is fine.)  ¨ Replace red meat with fish, poultry, and soy protein (like tofu). ¨ Limit processed and packaged foods like chips, crackers, and cookies. · Be active. Exercise can improve your cholesterol level. Get at least 30 minutes of exercise on most days of the week. Walking is a good choice. You also may want to do other activities, such as running, swimming, cycling, or playing tennis or team sports. · Stay at a healthy weight. Lose weight if you need to. · Don't smoke. If you need help quitting, talk to your doctor about stop-smoking programs and medicines. These can increase your chances of quitting for good. How is high cholesterol treated? The goal of treatment is to reduce your chances of having a heart attack or stroke. The goal is not to lower your cholesterol numbers only. · You may make lifestyle changes, such as eating healthy foods, not smoking, losing weight, and being more active. · You may have to take medicine. Follow-up care is a key part of your treatment and safety. Be sure to make and go to all appointments, and call your doctor if you are having problems.  It's also a good idea to know your test results and keep a list of the medicines you take. Where can you learn more? Go to http://lin-sandra.info/. Enter J662 in the search box to learn more about \"Learning About High Cholesterol. \"  Current as of: September 21, 2016  Content Version: 11.4  © 4274-0333 [employer]. Care instructions adapted under license by [employer] (which disclaims liability or warranty for this information). If you have questions about a medical condition or this instruction, always ask your healthcare professional. [place] [address] any warranty or liability for your use of this information. Continue current therapy plan except for indicated above. Verbal and written instructions (see AVS) provided.  Patient expresses understanding of diagnosis and treatment plan. Follow-up Disposition:  Return in about 3 months (around 8/31/2018). Jared Ojeda.  Victorina Jade MD

## 2018-05-31 NOTE — PROGRESS NOTES
Chief Complaint   Patient presents with    Hypertension     f/u     Health Maintenance Due   Topic Date Due    Hepatitis C Screening  1950    DTaP/Tdap/Td series (1 - Tdap) 11/12/1970    FOBT Q 1 YEAR AGE 50-75  11/12/1999    ZOSTER VACCINE AGE 60>  09/12/2009    GLAUCOMA SCREENING Q2Y  11/12/2014    Pneumococcal 65+ Low/Medium Risk (1 of 2 - PCV13) 11/12/2014    MEDICARE YEARLY EXAM  04/25/2018     Visit Vitals    /86 (BP 1 Location: Left arm, BP Patient Position: Sitting)    Pulse 69    Temp 97.8 °F (36.6 °C) (Oral)    Resp 16    Ht 5' 7\" (1.702 m)    Wt 208 lb 6.4 oz (94.5 kg)    SpO2 99%    BMI 32.64 kg/m2     Yara Arteaga, BENNETTN

## 2018-06-12 ENCOUNTER — OFFICE VISIT (OUTPATIENT)
Dept: FAMILY MEDICINE CLINIC | Age: 68
End: 2018-06-12

## 2018-06-12 VITALS
OXYGEN SATURATION: 95 % | HEIGHT: 67 IN | TEMPERATURE: 97.4 F | DIASTOLIC BLOOD PRESSURE: 83 MMHG | SYSTOLIC BLOOD PRESSURE: 131 MMHG | HEART RATE: 69 BPM | WEIGHT: 207 LBS | BODY MASS INDEX: 32.49 KG/M2 | RESPIRATION RATE: 14 BRPM

## 2018-06-12 DIAGNOSIS — S80.812A ABRASION, LEFT LOWER LEG, INITIAL ENCOUNTER: Primary | ICD-10-CM

## 2018-06-12 RX ORDER — TRAMADOL HYDROCHLORIDE 50 MG/1
50 TABLET ORAL
COMMUNITY
Start: 2018-06-03 | End: 2018-08-30

## 2018-06-12 RX ORDER — CEPHALEXIN 500 MG/1
500 CAPSULE ORAL
COMMUNITY
Start: 2018-06-03 | End: 2018-06-13

## 2018-06-12 NOTE — PATIENT INSTRUCTIONS
Continue antibiotics  Clean daily with soap and water  Leave open to air when possible  Await culture results  Keep leg elevated  Follow-up if not continuing to improve
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
## 2018-06-12 NOTE — PROGRESS NOTES
Simon Dixon is a 75 y.o. male who presents to the office today with the following:  Chief Complaint   Patient presents with    Leg Injury     f/u ER visit 06/03/2018; lower left leg injury riding lawnmower accident       No Known Allergies    Current Outpatient Prescriptions   Medication Sig    cephALEXin (KEFLEX) 500 mg capsule Take 500 mg by mouth.  traMADol (ULTRAM) 50 mg tablet Take 50 mg by mouth.  nebivolol (BYSTOLIC) 10 mg tablet Take 1 Tab by mouth daily.  hydroCHLOROthiazide (HYDRODIURIL) 12.5 mg tablet Take 1 Tab by mouth daily.  raNITIdine (ZANTAC) 150 mg tablet 1 tablet daily    atorvastatin (LIPITOR) 10 mg tablet Take 1 Tab by mouth daily.  cholecalciferol (VITAMIN D3) 1,000 unit cap Take  by mouth daily. No current facility-administered medications for this visit. Past Medical History:   Diagnosis Date    Hypercholesterolemia     Hypertension     Prostate CA ([facility] [address] 2017    Ulcer, stomach peptic, acute        Past Surgical History:   Procedure Laterality Date    HX ACL RECONSTRUCTION Left        History   Smoking Status    Never Smoker   Smokeless Tobacco    Never Used       Family History   Problem Relation Age of Onset    Heart Disease Mother     No Known Problems Father     Heart Disease Sister     Hypertension Sister     Cancer Brother      prostate    Hypertension Brother     Heart Disease Sister     Heart Disease Sister     Hypertension Brother     Heart Disease Brother     Hypertension Brother          History of Present Illness:  Patient here for ER follow-up left leg injury. Last week while riding his ride on [employer] it went forward unexpectedly and he got his left leg caught under an 18 vega. He was able to back the tractor up. He did sustain some superficial abrasions lacerations to his left anterior shin. He did go to the emergency room the next day. X-rays of the area were negative.   Patient is quite clear he had a tetanus shot within the last 5 years. The area was cleaned. He was placed on Keflex and as needed Ultram.  He did not get the Keflex for a couple of days after the ER visit. He has at least 5 days of prescription left    Patient states the swelling is better. It is still sore and stiff. No fever no chills. He admits he has not been keeping it elevated and has been doing his normal daily routine and work. He has had several injuries to his left leg in the past     Patient was in to see me recently for routine medical checkup and has a follow-up scheduled for the fall      Review of Systems:    Review of systems negative except as noted above      Physical Exam:  Visit Vitals    /83 (BP 1 Location: Left arm, BP Patient Position: Sitting)    Pulse 69    Temp 97.4 °F (36.3 °C) (Oral)    Resp 14    Ht 5' 7\" (1.702 m)    Wt 207 lb (93.9 kg)    SpO2 95%    BMI 32.42 kg/m2     Vitals:    06/12/18 1056   BP: 131/83   BP 1 Location: Left arm   BP Patient Position: Sitting   Pulse: 69   Resp: 14   Temp: 97.4 °F (36.3 °C)   TempSrc: Oral   SpO2: 95%   Weight: 207 lb (93.9 kg)   Height: 5' 7\" (1.702 m)     Patient was in no acute distress vital signs stable afebrile  Left leg. Patient had a healing flap type laceration upper anterior shin. Approximately 2 cm area open granulating in. Slight whitish exudate. Culture was done. Minimal surrounding erythema. Not warm to touch. No flocculence. Just below this he had a dry healed smaller flap type laceration. No lower extremity edema. No calf tenderness. He was able to flex and extend his foot against resistance    Assessment/Plan:  1. Abrasion, left lower leg, initial encounter  Patient with contusion abrasion and superficial laceration left leg. Slowly healing in. On antibiotics. Culture was done. I recommend he finish antibiotics. I strongly recommend he keep his leg elevated more often to speed the healing process.   Also recommending they avoid the peroxide the been using and wash it with soap and water daily. Also keep it open to air as much as possible. They will follow-up if not continuing to improve otherwise will keep her previously planned follow-up  - AEROBIC BACTERIAL CULTURE    Patient Instructions   Continue antibiotics  Clean daily with soap and water  Leave open to air when possible  Await culture results  Keep leg elevated  Follow-up if not continuing to improve          Continue current therapy plan except for indicated above. Verbal and written instructions (see AVS) provided.  Patient expresses understanding of diagnosis and treatment plan. Follow-up Disposition:  Return if symptoms worsen or fail to improve. Breanne Peoples.  Mitch Rendon MD

## 2018-06-17 LAB
BACTERIA SPEC AEROBE CULT: ABNORMAL
BACTERIA SPEC AEROBE CULT: ABNORMAL

## 2018-06-18 NOTE — PROGRESS NOTES
Wound culture showed just light growth of bacteria beyond normal skin jai  This is not of concern as long as the area continues to heal in  Please contact patient and make sure that his abrasion has continued to heal  If not he needs to schedule follow-up for me to check this again

## 2018-08-23 DIAGNOSIS — E78.5 HYPERLIPIDEMIA, UNSPECIFIED HYPERLIPIDEMIA TYPE: ICD-10-CM

## 2018-08-23 RX ORDER — ATORVASTATIN CALCIUM 10 MG/1
10 TABLET, FILM COATED ORAL DAILY
Qty: 30 TAB | Refills: 0 | Status: SHIPPED | OUTPATIENT
Start: 2018-08-23 | End: 2018-08-30 | Stop reason: SDUPTHER

## 2018-08-23 NOTE — TELEPHONE ENCOUNTER
Patient requesting refill of his atorvastatin  This was started in June  He was supposed to schedule follow-up with me in August for recheck and lab work but has not made the appointment yet  Based on this I was able to refill the medication ×1 month until he schedules the appointment and we recheck his lab work

## 2018-08-30 ENCOUNTER — OFFICE VISIT (OUTPATIENT)
Dept: FAMILY MEDICINE CLINIC | Age: 68
End: 2018-08-30

## 2018-08-30 VITALS
HEIGHT: 67 IN | DIASTOLIC BLOOD PRESSURE: 83 MMHG | TEMPERATURE: 96.3 F | BODY MASS INDEX: 32.52 KG/M2 | WEIGHT: 207.2 LBS | OXYGEN SATURATION: 96 % | SYSTOLIC BLOOD PRESSURE: 153 MMHG | RESPIRATION RATE: 18 BRPM | HEART RATE: 73 BPM

## 2018-08-30 DIAGNOSIS — K21.9 GASTROESOPHAGEAL REFLUX DISEASE, ESOPHAGITIS PRESENCE NOT SPECIFIED: ICD-10-CM

## 2018-08-30 DIAGNOSIS — Z86.11 HISTORY OF TB (TUBERCULOSIS): ICD-10-CM

## 2018-08-30 DIAGNOSIS — Z98.890 HISTORY OF LEFT KNEE SURGERY: ICD-10-CM

## 2018-08-30 DIAGNOSIS — I10 ESSENTIAL HYPERTENSION: ICD-10-CM

## 2018-08-30 DIAGNOSIS — Z85.46 HISTORY OF PROSTATE CANCER: ICD-10-CM

## 2018-08-30 DIAGNOSIS — N52.35 ERECTILE DYSFUNCTION FOLLOWING RADIATION THERAPY: ICD-10-CM

## 2018-08-30 DIAGNOSIS — E78.5 HYPERLIPIDEMIA, UNSPECIFIED HYPERLIPIDEMIA TYPE: Primary | ICD-10-CM

## 2018-08-30 DIAGNOSIS — Z23 ENCOUNTER FOR IMMUNIZATION: ICD-10-CM

## 2018-08-30 RX ORDER — NEBIVOLOL 10 MG/1
10 TABLET ORAL DAILY
Qty: 30 TAB | Refills: 5 | Status: SHIPPED | OUTPATIENT
Start: 2018-08-30 | End: 2019-10-08 | Stop reason: SDUPTHER

## 2018-08-30 RX ORDER — SILDENAFIL 50 MG/1
50 TABLET, FILM COATED ORAL AS NEEDED
Qty: 10 TAB | Refills: 1 | Status: SHIPPED | OUTPATIENT
Start: 2018-08-30 | End: 2019-03-04 | Stop reason: SDUPTHER

## 2018-08-30 RX ORDER — RANITIDINE 300 MG/1
300 TABLET ORAL DAILY
Qty: 30 TAB | Refills: 1 | Status: SHIPPED | OUTPATIENT
Start: 2018-08-30 | End: 2020-03-02

## 2018-08-30 RX ORDER — ATORVASTATIN CALCIUM 10 MG/1
10 TABLET, FILM COATED ORAL DAILY
Qty: 30 TAB | Refills: 5 | Status: SHIPPED | OUTPATIENT
Start: 2018-08-30 | End: 2018-10-30 | Stop reason: SDUPTHER

## 2018-08-30 RX ORDER — HYDROCHLOROTHIAZIDE 25 MG/1
25 TABLET ORAL DAILY
Qty: 30 TAB | Refills: 1 | Status: SHIPPED | OUTPATIENT
Start: 2018-08-30 | End: 2019-06-04 | Stop reason: DRUGHIGH

## 2018-08-30 NOTE — PROGRESS NOTES
Nikos Betts is a 75 y.o. male who presents to the office today with the following:  Chief Complaint   Patient presents with    Knee Injury     left knee injury on tractor 3 mos. ago; pt wants MRI on knee; 6-7/10; worse w/rain per pt. No Known Allergies    Current Outpatient Prescriptions   Medication Sig    nebivolol (BYSTOLIC) 10 mg tablet Take 1 Tab by mouth daily.  atorvastatin (LIPITOR) 10 mg tablet Take 1 Tab by mouth daily.  hydroCHLOROthiazide (HYDRODIURIL) 25 mg tablet Take 1 Tab by mouth daily.  raNITIdine (ZANTAC) 300 mg tablet Take 1 Tab by mouth daily.  sildenafil citrate (VIAGRA) 50 mg tablet Take 1 Tab by mouth as needed.  cholecalciferol (VITAMIN D3) 1,000 unit cap Take  by mouth daily. No current facility-administered medications for this visit. Past Medical History:   Diagnosis Date    Hypercholesterolemia     Hypertension     Prostate CA ([facility] [address] 2017    Ulcer, stomach peptic, acute        Past Surgical History:   Procedure Laterality Date    HX ACL RECONSTRUCTION Left        History   Smoking Status    Never Smoker   Smokeless Tobacco    Never Used       Family History   Problem Relation Age of Onset    Heart Disease Mother     No Known Problems Father     Heart Disease Sister     Hypertension Sister     Cancer Brother      prostate    Hypertension Brother     Heart Disease Sister     Heart Disease Sister     Hypertension Brother     Heart Disease Brother     Hypertension Brother          History of Present Illness:  Patient here for ongoing medical follow-up      Patient does have a history of tuberculosis. He was treated initially in the 76s after being in [facility].  He had recurrence in 2005. He was hospitalized at that time. He follow with pulmonology. He was treated and was felt to be cured. He has not had any ongoing respiratory issues. He did have an inhaler to be used at one point in time years ago but has never needed.   I did treat the patient for a possible right upper lobe pneumonia in May. Those symptoms have resolved. I am repeating his chest x-ray to obtain a new baseline. Patient does have a history of hypertension. He continues with his Bystolic. Blood pressure was elevated at the last spring and I did add in low-dose hydrochlorothiazide. He is tolerating it well. Blood pressure is improved at last visit but back up a bit today. I am increasing his hydrochlorothiazide. He denies any other cardiac history. No MI CAD CHF A. fib. He has no cardiac complaints. He has had a history of peptic ulcer disease and ongoing GERD complaints so not a good candidate for aspirin at this point. Recent lab work reveals normal basic metabolic profile thyroid functions urinalysis and CBC. .  Repeat labs ordered    Patient states his previous physician did have him seen by cardiology. He has had a negative stress test in the past.  He just saw cardiology in 2017 and a felt things were stable and made no changes in his regimen. Patient does have hyperlipidemia with an LDL of 131. Given his 10 year cardiac risk statin therapy is recommended. Patient is now on atorvastatin and tolerating the medication well. I have given him a low-cholesterol diet. Lab work ordered today    No history of diabetes. Random BG normal    Patient does have elevated BMI. He is aware of the importance of diet and exercise for weight loss. He has DJD and a history of meniscal injury and ACL injury left knee. He has had 2 knee surgeries in the past.  These were complicated. He does continue to use a cane and walk with a limp. He was following with his orthopedist in [place]. He had a cortisone shot in the spring. He does have ongoing pain left knee. He is interested in a follow-up MRI scan and appointment to see a new orthopedist locally for further evaluation.   He did injure that knee over the summer when he suffered abrasion after driving a lawnmower under a tractor trailer. He states he is not sure whether his knee hurts worse after that injury or not. The abrasion and infection have healed    Patient has a history of peptic ulcer disease in the past.  He was treated at that time. He does have some ongoing reflux symptoms mostly after he eats certain food or drink soda. No exertional symptoms. He previously responded to Zantac. I did start low-dose Zantac at the last visit. It is helped but he still has ongoing symptoms. Those symptoms are again relieved if he takes Tums. I am increasing the dose today. He will notify me if his stomach symptoms persist    Patient has a history of prostate cancer. This is been treated with radiation. He did see his urologist prior to moving here in December 2017 lab work at that time apparently was normal.  No evidence of recurrence. PSA ordered today    Patient does have a ED after his prostate radiation therapy. He did request a prescription for Viagra and I wrote for this today with instructions    Patient states his past medical history otherwise negative    Patient states he had a negative colonoscopy in 2014 and was told to go 10 years for his next scope    Patient has had the Prevnar in the past.  He was given the Pneumovax 23 5/18. I did recommend the shingles vaccine at his pharmacy. He will come in if he injures himself for a tetanus booster.   Flu shot given 8/18      Review of Systems:    Review of systems negative except as noted above      Physical Exam:  Visit Vitals    /83 (BP 1 Location: Right arm, BP Patient Position: Sitting)    Pulse 73    Temp 96.3 °F (35.7 °C) (Oral)    Resp 18    Ht 5' 7\" (1.702 m)    Wt 207 lb 3.2 oz (94 kg)    SpO2 96%    BMI 32.45 kg/m2     Vitals:    08/30/18 0726   BP: 153/83   BP 1 Location: Right arm   BP Patient Position: Sitting   Pulse: 73   Resp: 18   Temp: 96.3 °F (35.7 °C)   TempSrc: Oral   SpO2: 96%   Weight: 207 lb 3.2 oz (94 kg) Height: 5' 7\" (1.702 m)     Patient no acute distress vital signs stable as above on repeat blood pressure was 145/95  Head is normocephalic  External ears normal..    Eyes PERRLA. EOMs full. Sclera clear Patient states it has been 2 years since he saw his eye doctor and is scheduling this appointment  Nose within normal limits. Nares  normal.  No significant congestion  OP mucosa normal, no obvious lesions. Pharynx normal.  No erythema or exudate. Structures midline. Neck no nodes no masses no bruits  Chest was clear no wheezes rhonchi or rales. Good air exchange  Cor regular rate and rhythm no S3-S4 no murmurs  Abdomen obese no HSM no masses soft and was nontender. He did point to the mid upper abdomen is a side of his discomfort  Extremities no edema. Nontender. Good range of motion  Patient did walk with a limp favoring his left knee  Left knee had scars from previous surgery. No effusion today. Abrasion had healed. Nontender to palpation. Decreased range of motion  EKG 2018 had normal sinus rhythm with some nonspecific T-wave flattening lateral leads      1. Hyperlipidemia, unspecified hyperlipidemia type  Patient now on atorvastatin. Checking lab work  - atorvastatin (LIPITOR) 10 mg tablet; Take 1 Tab by mouth daily. Dispense: 30 Tab; Refill: 5  - hydroCHLOROthiazide (HYDRODIURIL) 25 mg tablet; Take 1 Tab by mouth daily. Dispense: 30 Tab; Refill: 1  - LIPID PANEL  - ALT  - AST    2. Essential hypertension  Blood pressure not optimally controlled. I am increasing hydrochlorothiazide. Encouraging potassium rich diet. Lab work today. He will be back next month for recheck  - nebivolol (BYSTOLIC) 10 mg tablet; Take 1 Tab by mouth daily. Dispense: 30 Tab; Refill: 5  - METABOLIC PANEL, BASIC    3. BMI 32.0-32.9,adult  Importance of diet and exercise stressed    4.  Gastroesophageal reflux disease, esophagitis presence not specified  Increase in strength of his ranitidine  - raNITIdine (ZANTAC) 300 mg tablet; Take 1 Tab by mouth daily. Dispense: 30 Tab; Refill: 1    5. History of left knee surgery    - MRI KNEE LT WO CONT; Future  - REFERRAL TO ORTHOPEDICS    6. History of TB (tuberculosis)    - XR CHEST PA LAT; Future    7. History of prostate cancer    - PROSTATE SPECIFIC AG    8. Erectile dysfunction following radiation therapy    - sildenafil citrate (VIAGRA) 50 mg tablet; Take 1 Tab by mouth as needed. Dispense: 10 Tab; Refill: 1    9. Encounter for immunization    - Influenza Vaccine Inactivated (IIV)(FLUAD), Subunit, Adjuvanted, IM, (75472)      Patient Instructions   Continue current medications except  Increase ranitidine to 300 mg daily  Follow-up if stomach pain persists    Increase hydrochlorothiazide to 25 mg daily  Increase foods in your diet high in potassium like orange juice or bananas    Add Viagra as needed    Work on diet and exercise    Lab work today    Chest x-ray  MRI scan of your knee    Appoint with orthopedist    Keep planned follow up visit in October          Continue current therapy plan except for indicated above. Verbal and written instructions (see AVS) provided.  Patient expresses understanding of diagnosis and treatment plan. Follow-up Disposition:  Return in about 6 weeks (around 10/11/2018). Opal Trujilloo.  Helder Castillo MD

## 2018-08-30 NOTE — PATIENT INSTRUCTIONS
Continue current medications except  Increase ranitidine to 300 mg daily  Follow-up if stomach pain persists    Increase hydrochlorothiazide to 25 mg daily  Increase foods in your diet high in potassium like orange juice or bananas    Add Viagra as needed    Work on diet and exercise    Lab work today    Chest x-ray  MRI scan of your knee    Appoint with orthopedist    Keep planned follow up visit in October

## 2018-08-30 NOTE — MR AVS SNAPSHOT
[facility] 
[phone] Patient: Leonor Gaitan MRN: [id number] :1950 Visit Information Date & Time Provider Department Dept. Phone Encounter #  
 2018  7:20 AM Doris Hartman MD [address] [place] [address] [phone] [id number] Follow-up Instructions Return in about 6 weeks (around 10/11/2018). Upcoming Health Maintenance Date Due Hepatitis C Screening 1950 DTaP/Tdap/Td series (1 - Tdap) 1970 ZOSTER VACCINE AGE 60> 2009 GLAUCOMA SCREENING Q2Y 2014 MEDICARE YEARLY EXAM 2018 Influenza Age 5 to Adult 2018 COLONOSCOPY 2024 Allergies as of 2018  Review Complete On: 2018 By: Doris Hartman MD  
 No Known Allergies Current Immunizations  Reviewed on 2018 Name Date Influenza Vaccine (Tri) Adjuvanted  Incomplete Pneumococcal Conjugate (PCV-13) 2014 Pneumococcal Polysaccharide (PPSV-23) 2018 Not reviewed this visit You Were Diagnosed With   
  
 Codes Comments Hyperlipidemia, unspecified hyperlipidemia type    -  Primary ICD-10-CM: E78.5 ICD-9-CM: 272.4 Essential hypertension     ICD-10-CM: I10 
ICD-9-CM: 401.9 BMI 32.0-32.9,adult     ICD-10-CM: C53.42 
ICD-9-CM: V85.32 Gastroesophageal reflux disease, esophagitis presence not specified     ICD-10-CM: K21.9 ICD-9-CM: 530.81 History of left knee surgery     ICD-10-CM: Z98.890 ICD-9-CM: V15.29 History of TB (tuberculosis)     ICD-10-CM: Z86.11 
ICD-9-CM: V12.01 History of prostate cancer     ICD-10-CM: Z85.46 
ICD-9-CM: V10.46 Erectile dysfunction following radiation therapy     ICD-10-CM: N52.35 ICD-9-CM: 607.84 Encounter for immunization     ICD-10-CM: R11 ICD-9-CM: V03.89 Vitals BP Pulse Temp Resp Height(growth percentile) Weight(growth percentile) 153/83 (BP 1 Location: Right arm, BP Patient Position: Sitting) 73 96.3 °F (35.7 °C) (Oral) 18 5' 7\" (1.702 m) 207 lb 3.2 oz (94 kg) SpO2 BMI Smoking Status 96% 32.45 kg/m2 Never Smoker BMI and BSA Data Body Mass Index Body Surface Area  
 32.45 kg/m 2 2.11 m 2 Preferred Pharmacy Pharmacy Name Phone [facility] Merna Quinn [phone] Your Updated Medication List  
  
   
This list is accurate as of 8/30/18  8:34 AM.  Always use your most recent med list.  
  
  
  
  
 atorvastatin 10 mg tablet Commonly known as:  LIPITOR Take 1 Tab by mouth daily. hydroCHLOROthiazide 25 mg tablet Commonly known as:  HYDRODIURIL Take 1 Tab by mouth daily. nebivolol 10 mg tablet Commonly known as:  BYSTOLIC Take 1 Tab by mouth daily. raNITIdine 300 mg tablet Commonly known as:  ZANTAC Take 1 Tab by mouth daily. sildenafil citrate 50 mg tablet Commonly known as:  VIAGRA Take 1 Tab by mouth as needed. VITAMIN D3 1,000 unit Cap Generic drug:  cholecalciferol Take  by mouth daily. Prescriptions Sent to Pharmacy Refills  
 nebivolol (BYSTOLIC) 10 mg tablet 5 Sig: Take 1 Tab by mouth daily. Class: Normal  
 Pharmacy: [address] [place] [address] Pawan Brenner Ph #: [phone] Route: Oral  
 atorvastatin (LIPITOR) 10 mg tablet 5 Sig: Take 1 Tab by mouth daily. Class: Normal  
 Pharmacy: [address] [place] [address] Pawan Brenner Ph #: [phone] Route: Oral  
 hydroCHLOROthiazide (HYDRODIURIL) 25 mg tablet 1 Sig: Take 1 Tab by mouth daily. Class: Normal  
 Pharmacy: [address] [address] Pawan Brenner Ph #: [phone] Route: Oral  
 raNITIdine (ZANTAC) 300 mg tablet 1 Sig: Take 1 Tab by mouth daily.   
 Class: Normal  
 Pharmacy: [address] [facility] Pawan Brenner Ph #: [phone] Route: Oral  
 sildenafil citrate (VIAGRA) 50 mg tablet 1 Sig: Take 1 Tab by mouth as needed. Class: Normal  
 Pharmacy: [facility] [address] SANTIAGO SANTAMARIA Michael Yue, Areli Brenner Ph #: [phone] Route: Oral  
  
We Performed the Following ALT [id number] CPT(R)] AST M1147256 CPT(R)] INFLUENZA VACCINE INACTIVATED (IIV), SUBUNIT, ADJUVANTED, IM W040489 CPT(R)] LIPID PANEL [29330 CPT(R)] METABOLIC PANEL, BASIC [48517 CPT(R)] PSA, DIAGNOSTIC (PROSTATE SPECIFIC AG) H890699 CPT(R)] REFERRAL TO ORTHOPEDICS [CNF559 Custom] Comments:  
 Persistent left knee pain History surgery in the past 
MRI scan ordered Beba Love orthopedist  
  
Follow-up Instructions Return in about 6 weeks (around 10/11/2018). To-Do List   
 08/30/2018 Imaging:  MRI KNEE LT WO CONT   
  
 09/06/2018 Imaging:  XR CHEST PA LAT Referral Information Referral ID Referred By Referred To  
  
 [id number] Kurt Rutledge Not Available Visits Status Start Date End Date 1 New Request 8/30/18 8/30/19 If your referral has a status of pending review or denied, additional information will be sent to support the outcome of this decision. Patient Instructions Continue current medications except Increase ranitidine to 300 mg daily Follow-up if stomach pain persists Increase hydrochlorothiazide to 25 mg daily Increase foods in your diet high in potassium like orange juice or bananas Add Viagra as needed Work on diet and exercise Lab work today Chest x-ray MRI scan of your knee Appoint with orthopedist 
 
Keep planned follow up visit in October Introducing [facility]! [facility] introduces [employer] patient portal. Now you can access parts of your medical record, email your doctor's office, and request medication refills online. 1. In your internet browser, go to [email or web address]. [email or web address] 2. Click on the First Time User? Click Here link in the Sign In box. You will see the New Member Sign Up page. 3. Enter your [employer] Access Code exactly as it appears below. You will not need to use this code after youve completed the sign-up process. If you do not sign up before the expiration date, you must request a new code. · [employer] Access Code: [email or web address] Expires: 11/28/2018  7:23 AM 
 
4. Enter the last four digits of your Social Security Number (xxxx) and Date of Birth (mm/dd/yyyy) as indicated and click Submit. You will be taken to the next sign-up page. 5. Create a LocalOn ID. This will be your LocalOn login ID and cannot be changed, so think of one that is secure and easy to remember. 6. Create a [employer] password. You can change your password at any time. 7. Enter your Password Reset Question and Answer. This can be used at a later time if you forget your password. 8. Enter your e-mail address. You will receive e-mail notification when new information is available in [address] 9. Click Sign Up. You can now view and download portions of your medical record. 10. Click the Download Summary menu link to download a portable copy of your medical information. If you have questions, please visit the Frequently Asked Questions section of the [employer] website. Remember, [employer] is NOT to be used for urgent needs. For medical emergencies, dial 911. Now available from your iPhone and Android! Please provide this summary of care documentation to your next provider. Your primary care clinician is listed as Marlyse Holter. If you have any questions after today's visit, please call [phone].

## 2018-08-30 NOTE — PROGRESS NOTES
Chief Complaint   Patient presents with    Knee Injury     left knee injury on tractor 3 mos. ago; pt wants MRI on knee; 6-7/10; worse w/rain per pt. Health Maintenance Due   Topic Date Due    Hepatitis C Screening  1950    DTaP/Tdap/Td series (1 - Tdap) 11/12/1970    ZOSTER VACCINE AGE 60>  09/12/2009    GLAUCOMA SCREENING Q2Y  11/12/2014    MEDICARE YEARLY EXAM  04/25/2018    Influenza Age 5 to Adult  08/01/2018     Visit Vitals    /83 (BP 1 Location: Right arm, BP Patient Position: Sitting)    Pulse 73    Temp 96.3 °F (35.7 °C) (Oral)    Resp 18    Ht 5' 7\" (1.702 m)    Wt 207 lb 3.2 oz (94 kg)    SpO2 96%    BMI 32.45 kg/m2     1. Have you been to the ER, urgent care clinic since your last visit? Hospitalized since your last visit? No    2. Have you seen or consulted any other health care providers outside of the [address] [place] [address] since your last visit? Include any pap smears or colon screening.  No    Chely Khan LPN

## 2018-08-31 LAB
ALT SERPL-CCNC: 18 IU/L (ref 0–44)
AST SERPL-CCNC: 21 IU/L (ref 0–40)
BUN SERPL-MCNC: 11 MG/DL (ref 8–27)
BUN/CREAT SERPL: 11 (ref 10–24)
CALCIUM SERPL-MCNC: 9.5 MG/DL (ref 8.6–10.2)
CHLORIDE SERPL-SCNC: 108 MMOL/L (ref 96–106)
CHOLEST SERPL-MCNC: 162 MG/DL (ref 100–199)
CO2 SERPL-SCNC: 23 MMOL/L (ref 20–29)
CREAT SERPL-MCNC: 1.02 MG/DL (ref 0.76–1.27)
GLUCOSE SERPL-MCNC: 88 MG/DL (ref 65–99)
HDLC SERPL-MCNC: 50 MG/DL
INTERPRETATION, 910389: NORMAL
LDLC SERPL CALC-MCNC: 90 MG/DL (ref 0–99)
POTASSIUM SERPL-SCNC: 4.1 MMOL/L (ref 3.5–5.2)
PSA SERPL-MCNC: 0.2 NG/ML (ref 0–4)
SODIUM SERPL-SCNC: 144 MMOL/L (ref 134–144)
SPECIMEN STATUS REPORT, ROLRST: NORMAL
TRIGL SERPL-MCNC: 109 MG/DL (ref 0–149)
VLDLC SERPL CALC-MCNC: 22 MG/DL (ref 5–40)

## 2018-09-04 NOTE — PROGRESS NOTES
Labs reviewed please notify patient  Chemistry panel normal  Lipids much improved on current regiment  Prostate test normal    Patient should continue his current medications  Keep his planned follow-up

## 2018-09-05 NOTE — PROGRESS NOTES
I have called and advised this patient of his lab results per Dr Erin Lopez review. Patient verbalizes understanding.

## 2018-09-24 DIAGNOSIS — Z86.11 HISTORY OF TB (TUBERCULOSIS): ICD-10-CM

## 2019-03-29 NOTE — TELEPHONE ENCOUNTER
Pharmacy sends fax that says\" pt would like a 90 day    Requested Prescriptions     Pending Prescriptions Disp Refills    atorvastatin (LIPITOR) 10 mg tablet 30 Tab 5     Sig: Take 1 Tab by mouth daily. no radiation

## 2021-04-19 ENCOUNTER — OFFICE VISIT (OUTPATIENT)
Dept: FAMILY MEDICINE CLINIC | Age: 71
End: 2021-04-19
Payer: MEDICARE

## 2021-04-19 VITALS
HEART RATE: 87 BPM | SYSTOLIC BLOOD PRESSURE: 154 MMHG | HEIGHT: 67 IN | WEIGHT: 215.25 LBS | DIASTOLIC BLOOD PRESSURE: 96 MMHG | RESPIRATION RATE: 16 BRPM | TEMPERATURE: 97.8 F | BODY MASS INDEX: 33.79 KG/M2 | OXYGEN SATURATION: 98 %

## 2021-04-19 DIAGNOSIS — I10 ESSENTIAL HYPERTENSION: ICD-10-CM

## 2021-04-19 DIAGNOSIS — Z00.00 MEDICARE ANNUAL WELLNESS VISIT, SUBSEQUENT: Primary | ICD-10-CM

## 2021-04-19 PROCEDURE — G8536 NO DOC ELDER MAL SCRN: HCPCS | Performed by: FAMILY MEDICINE

## 2021-04-19 PROCEDURE — G0439 PPPS, SUBSEQ VISIT: HCPCS | Performed by: FAMILY MEDICINE

## 2021-04-19 PROCEDURE — 99213 OFFICE O/P EST LOW 20 MIN: CPT | Performed by: FAMILY MEDICINE

## 2021-04-19 PROCEDURE — G8755 DIAS BP > OR = 90: HCPCS | Performed by: FAMILY MEDICINE

## 2021-04-19 PROCEDURE — G8510 SCR DEP NEG, NO PLAN REQD: HCPCS | Performed by: FAMILY MEDICINE

## 2021-04-19 PROCEDURE — 3017F COLORECTAL CA SCREEN DOC REV: CPT | Performed by: FAMILY MEDICINE

## 2021-04-19 PROCEDURE — G8417 CALC BMI ABV UP PARAM F/U: HCPCS | Performed by: FAMILY MEDICINE

## 2021-04-19 PROCEDURE — G8753 SYS BP > OR = 140: HCPCS | Performed by: FAMILY MEDICINE

## 2021-04-19 PROCEDURE — 1101F PT FALLS ASSESS-DOCD LE1/YR: CPT | Performed by: FAMILY MEDICINE

## 2021-04-19 PROCEDURE — G8427 DOCREV CUR MEDS BY ELIG CLIN: HCPCS | Performed by: FAMILY MEDICINE

## 2021-04-19 NOTE — PROGRESS NOTES
4/19/2021      Chief Complaint   Patient presents with    Hypertension    Annual Wellness Visit         History of Present Illness:        Melchor Escobedo is a 69 y.o. male who returns to recheck BP, but he did not take his BP med. Feels OK except for some loose stool. No Known Allergies    Current Outpatient Medications   Medication Sig    trospium (SANCTURA) 20 mg tablet Take 20 mg by mouth Before breakfast, lunch, and dinner.  sildenafil citrate (Viagra) 50 mg tablet Take 1 Tab by mouth as needed for Other (ED).  albuterol (PROVENTIL HFA, VENTOLIN HFA, PROAIR HFA) 90 mcg/actuation inhaler Take 2 Puffs by inhalation every four (4) hours as needed for Wheezing.  metoprolol succinate (TOPROL-XL) 50 mg XL tablet Take  by mouth daily.  tamsulosin (FLOMAX) 0.4 mg capsule Take 0.4 mg by mouth daily.  omeprazole (PRILOSEC) 20 mg capsule Take 20 mg by mouth daily.  hydroCHLOROthiazide (HYDRODIURIL) 12.5 mg tablet TAKE 1 TABLET BY MOUTH ONCE DAILY (Patient taking differently: 25 mg.)    acetaminophen (TYLENOL) 325 mg tablet Take  by mouth every four (4) hours as needed for Pain.  atorvastatin (LIPITOR) 10 mg tablet Take 1 Tab by mouth daily. (Patient taking differently: Take 10 mg by mouth daily. Takes half of a 20mg)    cholecalciferol (VITAMIN D3) 1,000 unit cap Take  by mouth daily. No current facility-administered medications for this visit. Physical Examination:    Visit Vitals  BP (!) 154/96 (BP 1 Location: Left upper arm, BP Patient Position: Sitting, BP Cuff Size: Adult) Comment: states he has not taking his BP medications today   Pulse 87   Temp 97.8 °F (36.6 °C) (Oral)   Resp 16   Ht 5' 7\" (1.702 m)   Wt 215 lb 4 oz (97.6 kg)   SpO2 98%   BMI 33.71 kg/m²      General:  Alert, cooperative, no distress. HEENT:  Normocephalic, without obvious abnormality, atraumatic. Conjunctivae/corneas clear. Pupils equal, round, reactive to light. Extraocular movements intact. TMs and external canals normal bilaterally. Nasal mucosa and oropharynx clear. Lungs: Clear to auscultation bilaterally. Chest wall:  No tenderness or deformity. Heart:  Regular rate and rhythm, S1, S2 normal, no murmur, click, rub, or gallop. Abdomen:   Soft, non-tender. Bowel sounds normal. No masses. No organomegaly. Extremities: Extremities normal, atraumatic, no cyanosis or edema. Pulses: 2+ and symmetric all extremities. Skin: Skin color, texture, turgor normal. No rashes or lesions. Lymph nodes: Cervical, supraclavicular, and axillary nodes normal.   Neurologic: CNII-XII intact. Normal strength, sensation, and reflexes throughout. ASSESSMENT AND PLAN    1. Medicare annual wellness visit, subsequent      2. Essential hypertension  Will return 4/21/ for nurse visit BP check. No orders of the defined types were placed in this encounter.           Ana Morales MD

## 2021-04-19 NOTE — PATIENT INSTRUCTIONS
Medicare Wellness Visit, Male    The best way to live healthy is to have a lifestyle where you eat a well-balanced diet, exercise regularly, limit alcohol use, and quit all forms of tobacco/nicotine, if applicable. Regular preventive services are another way to keep healthy. Preventive services (vaccines, screening tests, monitoring & exams) can help personalize your care plan, which helps you manage your own care. Screening tests can find health problems at the earliest stages, when they are easiest to treat. [facility] follows the current, evidence-based guidelines published by the [facility]up[facility] ([facility]) when recommending preventive services for our patients. Because we follow these guidelines, sometimes recommendations change over time as research supports it. (For example, a prostate screening blood test is no longer routinely recommended for men with no symptoms). Of course, you and your doctor may decide to screen more often for some diseases, based on your risk and co-morbidities (chronic disease you are already diagnosed with). Preventive services for you include:  - Medicare offers their members a free annual wellness visit, which is time for you and your primary care provider to discuss and plan for your preventive service needs. Take advantage of this benefit every year!  -All adults over age 72 should receive the recommended pneumonia vaccines. Current USPSTF guidelines recommend a series of two vaccines for the best pneumonia protection.   -All adults should have a flu vaccine yearly and tetanus vaccine every 10 years.  -All adults age 48 and older should receive the shingles vaccines (series of two vaccines).        -All adults age 38-68 who are overweight should have a diabetes screening test once every three years.   -Other screening tests & preventive services for persons with diabetes include: an eye exam to screen for diabetic retinopathy, a kidney function test, a foot exam, and stricter control over your cholesterol.   -Cardiovascular screening for adults with routine risk involves an electrocardiogram (ECG) at intervals determined by the provider.   -Colorectal cancer screening should be done for adults age 54-65 with no increased risk factors for colorectal cancer. There are a number of acceptable methods of screening for this type of cancer. Each test has its own benefits and drawbacks. Discuss with your provider what is most appropriate for you during your annual wellness visit. The different tests include: colonoscopy (considered the best screening method), a fecal occult blood test, a fecal DNA test, and sigmoidoscopy.  -All adults born [facility] should be screened once for Hepatitis C.  -An Abdominal Aortic Aneurysm (AAA) Screening is recommended for men age 73-68 who has ever smoked in their lifetime.      Here is a list of your current Health Maintenance items (your personalized list of preventive services) with a due date:  Health Maintenance Due   Topic Date Due    COVID-19 Vaccine (1) Never done    DTaP/Tdap/Td  (1 - Tdap) Never done    Shingles Vaccine (1 of 2) Never done    Cholesterol Test   03/02/2021

## 2021-04-19 NOTE — PROGRESS NOTES
1. Have you been to the ER, urgent care clinic since your last visit? Hospitalized since your last visit? No    2. Have you seen or consulted any other health care providers outside of the [address] [place] [address] [place] since your last visit? Include any pap smears or colon screening. Yes POST [facility] (PCP visit) 3/2021       Functional Ability:   Does the patient exhibit a steady gait? yes   How long did it take the patient to get up and walk from a sitting position? 10 seconds    Is the patient self reliant?  (ie can do own laundry, meals, household chores)  yes     Does the patient handle his/her own medications? Wife takes care of his medications      Does the patient handle his/her own money? yes     Is the patients home safe (ie good lighting, handrails on stairs and bath, etc.)? yes     Did you notice or did patient express any hearing difficulties? yes     Did you notice or did patient express any vision difficulties? Yes - Cataracts      Were distance and reading eye charts used? no       Advance Care Planning:   Patient was offered the opportunity to discuss advance care planning:  yes     Does patient have an Advance Directive:  no   If no, did you provide information on Caring Connections?   yes     ADL Assessment 2/5/2021   Feeding yourself No Help Needed   Getting from bed to chair No Help Needed   Getting dressed No Help Needed   Bathing or showering No Help Needed   Walk across the room (includes cane/walker) No Help Needed   Using the telphone No Help Needed   Taking your medications No Help Needed   Preparing meals No Help Needed   Managing money (expenses/bills) No Help Needed   Moderately strenuous housework (laundry) No Help Needed   Shopping for personal items (toiletries/medicines) No Help Needed   Shopping for groceries No Help Needed   Driving No Help Needed   Climbing a flight of stairs No Help Needed   Getting to places beyond walking distances No Help Needed       Abuse Screening Questionnaire 2/5/2021   Do you ever feel afraid of your partner? N   Are you in a relationship with someone who physically or mentally threatens you? N   Is it safe for you to go home? Y     This is the Subsequent Medicare Annual Wellness Exam, performed 12 months or more after the Initial AWV or the last Subsequent AWV    I have reviewed the patient's medical history in detail and updated the computerized patient record. Assessment/Plan   Education and counseling provided:  Are appropriate based on today's review and evaluation    1. Medicare annual wellness visit, subsequent       Depression Risk Factor Screening     3 most recent PHQ Screens 4/19/2021   Little interest or pleasure in doing things Not at all   Feeling down, depressed, irritable, or hopeless Not at all   Total Score PHQ 2 0   Poor appetite, weight loss, or overeating -       Alcohol Risk Screen    Do you average more than 1 drink per night or more than 7 drinks a week: No    In the past three months have you have had more than 4 drinks containing alcohol on one occasion: No        Functional Ability and Level of Safety    Hearing: The patient wears hearing aids. Activities of Daily Living: The home contains: no safety equipment. Patient does total self care      Ambulation: with no difficulty     Fall Risk:  Fall Risk Assessment, last 12 mths 4/19/2021   Able to walk? Yes   Fall in past 12 months? 0   Do you feel unsteady?  0   Are you worried about falling 0      Abuse Screen:  Patient is not abused       Cognitive Screening    Has your family/caregiver stated any concerns about your memory: no     Cognitive Screening: Normal - Clock Drawing Test    Health Maintenance Due     Health Maintenance Due   Topic Date Due    COVID-19 Vaccine (1) Never done    DTaP/Tdap/Td series (1 - Tdap) Never done    Shingrix Vaccine Age 50> (1 of 2) Never done    Lipid Screen  03/02/2021       Patient Care Team   Patient Care Team:  Alton Knight MD as PCP - General (Palliative Medicine)  Alton Knight [facility]    History     Patient Active Problem List   Diagnosis Code    Bronchitis J40    History of TB (tuberculosis) Z86.11    Essential hypertension I10    Hyperlipidemia E78.5    BMI 32.0-32.9,adult Z68.32    History of prostate cancer Z85.46    Gastroesophageal reflux disease K21.9    History of left knee surgery Z98.890     Past Medical History:   Diagnosis Date    Hypercholesterolemia     Hypertension     Prostate CA ([facility] [address] 2017    Ulcer, stomach peptic, acute       Past Surgical History:   Procedure Laterality Date    HX ACL RECONSTRUCTION Left      Current Outpatient Medications   Medication Sig Dispense Refill    trospium (SANCTURA) 20 mg tablet Take 20 mg by mouth Before breakfast, lunch, and dinner.  sildenafil citrate (Viagra) 50 mg tablet Take 1 Tab by mouth as needed for Other (ED). 10 Tab 5    albuterol (PROVENTIL HFA, VENTOLIN HFA, PROAIR HFA) 90 mcg/actuation inhaler Take 2 Puffs by inhalation every four (4) hours as needed for Wheezing. 1 Inhaler 1    metoprolol succinate (TOPROL-XL) 50 mg XL tablet Take  by mouth daily.  tamsulosin (FLOMAX) 0.4 mg capsule Take 0.4 mg by mouth daily.  omeprazole (PRILOSEC) 20 mg capsule Take 20 mg by mouth daily.  hydroCHLOROthiazide (HYDRODIURIL) 12.5 mg tablet TAKE 1 TABLET BY MOUTH ONCE DAILY (Patient taking differently: 25 mg.) 90 Tab 0    acetaminophen (TYLENOL) 325 mg tablet Take  by mouth every four (4) hours as needed for Pain.  atorvastatin (LIPITOR) 10 mg tablet Take 1 Tab by mouth daily. (Patient taking differently: Take 10 mg by mouth daily. Takes half of a 20mg) 90 Tab 0    cholecalciferol (VITAMIN D3) 1,000 unit cap Take  by mouth daily.        No Known Allergies    Family History   Problem Relation Age of Onset    Heart Disease Mother     No Known Problems Father     Heart Disease Sister  Hypertension Sister     Cancer Brother         prostate    Hypertension Brother     Heart Disease Sister     Heart Disease Sister     Hypertension Brother     Heart Disease Brother     Hypertension Brother      Social History     Tobacco Use    Smoking status: Never Smoker    Smokeless tobacco: Never Used   Substance Use Topics    Alcohol use: No      Non-Provider Advance Care Planning (ACP) Note    Date of ACP Conversation: 4/19/2021  Persons included in Conversation: patient  Length of ACP Conversation in minutes: <16 minutes (Non-Billable)    Conversation requested by:   Provider    Authorized Decision Maker (if patient is incapable of making informed decisions): This person is:  Next of Kin by law (only applies in absence of a Healthcare Power of  or Legal Guardian)        General ACP for ALL Patients with Decision Making Capacity:    Advance Directive Conversation with Patients who have not yet planned:  Importance of advance care planning, including choosing a healthcare agent to communicate patient's healthcare decisions if patient lost the ability to make decisions, such as after a sudden illness or accident    Review of Existing Advance Directive: (Select questions covered)  \"What information were you given about medical decisions to consider before completing your advance directive? \" conversation starter kit     Interventions Provided:  Provided ACP educational materials:  [address] [place] [address] [address], [address]

## 2021-04-21 ENCOUNTER — CLINICAL SUPPORT (OUTPATIENT)
Dept: FAMILY MEDICINE CLINIC | Age: 71
End: 2021-04-21

## 2021-04-21 VITALS
TEMPERATURE: 97.5 F | DIASTOLIC BLOOD PRESSURE: 84 MMHG | OXYGEN SATURATION: 98 % | HEART RATE: 68 BPM | SYSTOLIC BLOOD PRESSURE: 150 MMHG

## 2021-04-21 DIAGNOSIS — I10 ESSENTIAL HYPERTENSION: Primary | ICD-10-CM

## 2021-04-21 NOTE — PROGRESS NOTES
Patient in office for BP check.       Visit Vitals  BP (!) 150/84 (BP 1 Location: Right arm, BP Patient Position: Sitting, BP Cuff Size: Adult)   Pulse 68   Temp 97.5 °F (36.4 °C) (Oral)   SpO2 98%

## 2021-07-22 ENCOUNTER — OFFICE VISIT (OUTPATIENT)
Dept: FAMILY MEDICINE CLINIC | Age: 71
End: 2021-07-22
Payer: MEDICARE

## 2021-07-22 VITALS
DIASTOLIC BLOOD PRESSURE: 89 MMHG | BODY MASS INDEX: 32.96 KG/M2 | OXYGEN SATURATION: 96 % | WEIGHT: 210 LBS | HEART RATE: 86 BPM | RESPIRATION RATE: 16 BRPM | SYSTOLIC BLOOD PRESSURE: 117 MMHG | HEIGHT: 67 IN | TEMPERATURE: 97.7 F

## 2021-07-22 DIAGNOSIS — K59.00 CONSTIPATION, UNSPECIFIED CONSTIPATION TYPE: Primary | ICD-10-CM

## 2021-07-22 PROCEDURE — G8417 CALC BMI ABV UP PARAM F/U: HCPCS | Performed by: PHYSICIAN ASSISTANT

## 2021-07-22 PROCEDURE — G8536 NO DOC ELDER MAL SCRN: HCPCS | Performed by: PHYSICIAN ASSISTANT

## 2021-07-22 PROCEDURE — 3017F COLORECTAL CA SCREEN DOC REV: CPT | Performed by: PHYSICIAN ASSISTANT

## 2021-07-22 PROCEDURE — G8752 SYS BP LESS 140: HCPCS | Performed by: PHYSICIAN ASSISTANT

## 2021-07-22 PROCEDURE — G8432 DEP SCR NOT DOC, RNG: HCPCS | Performed by: PHYSICIAN ASSISTANT

## 2021-07-22 PROCEDURE — G8754 DIAS BP LESS 90: HCPCS | Performed by: PHYSICIAN ASSISTANT

## 2021-07-22 PROCEDURE — G8427 DOCREV CUR MEDS BY ELIG CLIN: HCPCS | Performed by: PHYSICIAN ASSISTANT

## 2021-07-22 PROCEDURE — 1101F PT FALLS ASSESS-DOCD LE1/YR: CPT | Performed by: PHYSICIAN ASSISTANT

## 2021-07-22 PROCEDURE — 99213 OFFICE O/P EST LOW 20 MIN: CPT | Performed by: PHYSICIAN ASSISTANT

## 2021-07-22 RX ORDER — POLYETHYLENE GLYCOL 3350 17 G/17G
17 POWDER, FOR SOLUTION ORAL DAILY
Qty: 289 G | Refills: 0 | Status: SHIPPED | OUTPATIENT
Start: 2021-07-22

## 2021-07-22 NOTE — PATIENT INSTRUCTIONS
One capful Miralax each night x 3 nights. If still no stool double dose for another 3 nights. Take stool softener tablets as prescribed. May try saline enema on first day to get things started. May also try warm bath with epsom salt soak, proper hydration with water, high fiber diet, and belly massage. Avoid constipating foods. Drink at min 64oz water daily, more if hot/sweating. Dry to walk regularly/stay active. Follow-up in 1 week, sooner if needed. To ER for any \"red flag\" symptoms as discussed (ie severe abdominal pain, unable to pass gas, fever. Willetta Smiles etc)     Constipation: Care Instructions  Your Care Instructions     Constipation means that you have a hard time passing stools (bowel movements). People pass stools from 3 times a day to once every 3 days. What is normal for you may be different. Constipation may occur with pain in the rectum and cramping. The pain may get worse when you try to pass stools. Sometimes there are small amounts of bright red blood on toilet paper or the surface of stools. This is because of enlarged veins near the rectum (hemorrhoids). A few changes in your diet and lifestyle may help you avoid ongoing constipation. Your doctor may also prescribe medicine to help loosen your stool. Some medicines can cause constipation. These include pain medicines and antidepressants. Tell your doctor about all the medicines you take. Your doctor may want to make a medicine change to ease your symptoms. Follow-up care is a key part of your treatment and safety. Be sure to make and go to all appointments, and call your doctor if you are having problems. It's also a good idea to know your test results and keep a list of the medicines you take. How can you care for yourself at home? · Drink plenty of fluids. If you have kidney, heart, or liver disease and have to limit fluids, talk with your doctor before you increase the amount of fluids you drink.   · Include high-fiber foods in your diet each day. These include fruits, vegetables, beans, and whole grains. · Get at least 30 minutes of exercise on most days of the week. Walking is a good choice. You also may want to do other activities, such as running, swimming, cycling, or playing tennis or team sports. · Take a fiber supplement, such as Citrucel or Metamucil, every day. Read and follow all instructions on the label. · Schedule time each day for a bowel movement. A daily routine may help. Take your time having your bowel movement. · Support your feet with a small step stool when you sit on the toilet. This helps flex your hips and places your pelvis in a squatting position. · Your doctor may recommend an over-the-counter laxative to relieve your constipation. Examples are Milk of Magnesia and MiraLax. Read and follow all instructions on the label. Do not use laxatives on a long-term basis. When should you call for help? Call your doctor now or seek immediate medical care if:    · You have new or worse belly pain.     · You have new or worse nausea or vomiting.     · You have blood in your stools. Watch closely for changes in your health, and be sure to contact your doctor if:    · Your constipation is getting worse.     · You do not get better as expected. Where can you learn more? Go to http://www.[employer].com/  Enter P343 in the search box to learn more about \"Constipation: Care Instructions. \"  Current as of: February 26, 2020               Content Version: 12.8  © 2006-2021 [employer]. Care instructions adapted under license by [employer] (which disclaims liability or warranty for this information). If you have questions about a medical condition or this instruction, always ask your healthcare professional. [place] [address] any warranty or liability for your use of this information.

## 2021-07-22 NOTE — PROGRESS NOTES
1. Have you been to the ER, urgent care clinic since your last visit? Hospitalized since your last visit? No    2. Have you seen or consulted any other health care providers outside of the [address] [place] [address] since your last visit? Include any pap smears or colon screening. Yes Where:  [facility] [place]

## 2021-07-22 NOTE — PROGRESS NOTES
Aidan Mack is a 69 y.o. male who presents to the office today with the following:  Chief Complaint   Patient presents with    Constipation     for 2 weeks, no BM for 2 weeks       HPI   Pt c/o difficulty having BM x 2 weeks. Says he has not had what is considered \"normal\" for him in that long. Will get a few small pieces of stool out occasionally. He tried ex-lax that helped some, but repeated it last night and this morning and still no BM. No recent diet changes, though did have some type of \"jello candy\"  No recent medication changes. Pt says stomach feels bloated and achy all over. Is able to pass gas. Is eating normally for the most part, but at times does not want to eat as much the way he is feeling. He denies any  sxs. Does have h/o prostate CA, pt says he is in remission s/p radiation tx. Does go annually for psa monitoring. Has had no fever, n/v/d, or other sxs otherwise. Nl colonoscopy 2014. ROS  See HPI. Past Medical History:   Diagnosis Date    Hypercholesterolemia     Hypertension     Prostate CA ([address] 2017    Ulcer, stomach peptic, acute        Past Surgical History:   Procedure Laterality Date    HX ACL RECONSTRUCTION Left        Allergies   Allergen Reactions    Terazosin Itching       Current Outpatient Medications   Medication Sig    polyethylene glycol (MIRALAX) 17 gram/dose powder Take 17 g by mouth daily.  docusate sodium (COLACE) 50 mg capsule Take 1 Capsule by mouth two (2) times a day for 30 days.  trospium (SANCTURA) 20 mg tablet Take 20 mg by mouth Before breakfast, lunch, and dinner.  albuterol (PROVENTIL HFA, VENTOLIN HFA, PROAIR HFA) 90 mcg/actuation inhaler Take 2 Puffs by inhalation every four (4) hours as needed for Wheezing.  metoprolol succinate (TOPROL-XL) 50 mg XL tablet Take  by mouth daily.  tamsulosin (FLOMAX) 0.4 mg capsule Take 0.4 mg by mouth daily.  omeprazole (PRILOSEC) 20 mg capsule Take 20 mg by mouth daily.     hydroCHLOROthiazide (HYDRODIURIL) 12.5 mg tablet TAKE 1 TABLET BY MOUTH ONCE DAILY (Patient taking differently: 25 mg.)    acetaminophen (TYLENOL) 325 mg tablet Take  by mouth every four (4) hours as needed for Pain.  atorvastatin (LIPITOR) 10 mg tablet Take 1 Tab by mouth daily. (Patient taking differently: Take 10 mg by mouth daily. Takes half of a 20mg)    cholecalciferol (VITAMIN D3) 1,000 unit cap Take  by mouth daily.  sildenafil citrate (Viagra) 50 mg tablet Take 1 Tab by mouth as needed for Other (ED). (Patient not taking: Reported on 7/22/2021)     No current facility-administered medications for this visit. Social History     Socioeconomic History    Marital status:      Spouse name: Not on file    Number of children: 11    Years of education: Not on file    Highest education level: Not on file   Occupational History    Occupation:      Comment: retired   Tobacco Use    Smoking status: Never Smoker    Smokeless tobacco: Never Used   Vaping Use    Vaping Use: Never assessed   Substance and Sexual Activity    Alcohol use: No    Drug use: No    Sexual activity: Not Currently     Partners: Female     Birth control/protection: None     Social Determinants of Health     Financial Resource Strain:     Difficulty of Paying Living Expenses:    Food Insecurity:     Worried About Running Out of Food in the Last Year:     Ran Out of Food in the Last Year:    Transportation Needs:     Lack of Transportation (Medical):      Lack of Transportation (Non-Medical):    Physical Activity:     Days of Exercise per Week:     Minutes of Exercise per Session:    Stress:     Feeling of Stress :    Social Connections:     Frequency of Communication with Friends and Family:     Frequency of Social Gatherings with Friends and Family:     Attends Jainism Services:     Active Member of Clubs or Organizations:     Attends Club or Organization Meetings:     Marital Status: Family History   Problem Relation Age of Onset    Heart Disease Mother     No Known Problems Father     Heart Disease Sister     Hypertension Sister     Cancer Brother         prostate    Hypertension Brother     Heart Disease Sister     Heart Disease Sister     Hypertension Brother     Heart Disease Brother     Hypertension Brother          Physical Exam:  Visit Vitals  /89 (BP 1 Location: Right arm, BP Patient Position: Sitting, BP Cuff Size: Adult)   Pulse 86   Temp 97.7 °F (36.5 °C) (Temporal)   Resp 16   Ht 5' 7\" (1.702 m)   Wt 210 lb (95.3 kg)   SpO2 96%   BMI 32.89 kg/m²     Physical Exam  Vitals and nursing note reviewed. HENT:      Head: Normocephalic and atraumatic. Eyes:      Conjunctiva/sclera: Conjunctivae normal.   Cardiovascular:      Rate and Rhythm: Normal rate and regular rhythm. Pulses: Normal pulses. Heart sounds: Normal heart sounds. Pulmonary:      Effort: Pulmonary effort is normal.      Breath sounds: Normal breath sounds. Abdominal:      General: There is distension. Palpations: Abdomen is soft. There is no mass. Tenderness: There is no abdominal tenderness. There is no guarding or rebound. Hernia: No hernia is present. Skin:     General: Skin is warm and dry. Neurological:      Mental Status: He is alert and oriented to person, place, and time. Gait: Gait is intact. Psychiatric:         Mood and Affect: Mood and affect normal.         Assessment/Plan:    ICD-10-CM ICD-9-CM    1. Constipation, unspecified constipation type  K59.00 564.00 polyethylene glycol (MIRALAX) 17 gram/dose powder      docusate sodium (COLACE) 50 mg capsule      XR ABD FLAT/ ERECT     One capful Miralax each night x 3 nights. If still no stool double dose for another 3 nights. Take stool softener tablets as prescribed. May try saline enema on first day to get things started.    May also try warm bath with epsom salt soak, proper hydration with water, high fiber diet, and belly massage. Avoid constipating foods. Drink at min 64oz water daily, more if hot/sweating. Dry to walk regularly/stay active. Follow-up in 1 week, sooner if needed. To ER for any \"red flag\" symptoms as discussed (ie severe abdominal pain, unable to pass gas, fever. [place]  etc)    Follow-up and Dispositions    · Return in about 1 week (around 7/29/2021), or sooner if symptoms worsen or fail to improve. Seek care in interim for any new sxs or other concerns. Pt verbalizes understanding and agrees with the plan.     Alejandro Zuniga PA-C

## 2021-07-29 ENCOUNTER — OFFICE VISIT (OUTPATIENT)
Dept: FAMILY MEDICINE CLINIC | Age: 71
End: 2021-07-29
Payer: MEDICARE

## 2021-07-29 VITALS
TEMPERATURE: 98 F | WEIGHT: 211 LBS | HEART RATE: 68 BPM | OXYGEN SATURATION: 98 % | HEIGHT: 67 IN | SYSTOLIC BLOOD PRESSURE: 162 MMHG | BODY MASS INDEX: 33.12 KG/M2 | DIASTOLIC BLOOD PRESSURE: 112 MMHG | RESPIRATION RATE: 18 BRPM

## 2021-07-29 DIAGNOSIS — K59.00 CONSTIPATION, UNSPECIFIED CONSTIPATION TYPE: Primary | ICD-10-CM

## 2021-07-29 DIAGNOSIS — I10 ESSENTIAL HYPERTENSION: ICD-10-CM

## 2021-07-29 PROCEDURE — 3017F COLORECTAL CA SCREEN DOC REV: CPT | Performed by: PHYSICIAN ASSISTANT

## 2021-07-29 PROCEDURE — G8432 DEP SCR NOT DOC, RNG: HCPCS | Performed by: PHYSICIAN ASSISTANT

## 2021-07-29 PROCEDURE — 99213 OFFICE O/P EST LOW 20 MIN: CPT | Performed by: PHYSICIAN ASSISTANT

## 2021-07-29 PROCEDURE — G8427 DOCREV CUR MEDS BY ELIG CLIN: HCPCS | Performed by: PHYSICIAN ASSISTANT

## 2021-07-29 PROCEDURE — 1101F PT FALLS ASSESS-DOCD LE1/YR: CPT | Performed by: PHYSICIAN ASSISTANT

## 2021-07-29 PROCEDURE — G8755 DIAS BP > OR = 90: HCPCS | Performed by: PHYSICIAN ASSISTANT

## 2021-07-29 PROCEDURE — G8753 SYS BP > OR = 140: HCPCS | Performed by: PHYSICIAN ASSISTANT

## 2021-07-29 PROCEDURE — G8536 NO DOC ELDER MAL SCRN: HCPCS | Performed by: PHYSICIAN ASSISTANT

## 2021-07-29 PROCEDURE — G8417 CALC BMI ABV UP PARAM F/U: HCPCS | Performed by: PHYSICIAN ASSISTANT

## 2021-07-29 NOTE — PROGRESS NOTES
Mati Ford is a 69 y.o. male who presents to the office today with the following:  Chief Complaint   Patient presents with    Follow-up    Constipation       HPI  Pt states he went to ER after developing chest pain in addition to his constipation. Had CT and XR scan with impression as follows:    CT IMPRESSION:   1. No acute findings within the abdomen or pelvis. 2. Small supraumbilical ventral hernia containing a knuckle of small   bowel. No evidence of bowel obstruction. 3. Diverticulosis. EXAM DESCRIPTION:    XR ABDOMEN 2 VW W CHEST 1 VW    COMPLETED DATE/TIME:    7/26/2021 7:58 am    REASON FOR EXAM:    Chest pain, constipation    COMPARISON:    Chest examination of April 26, 2018. .   REPORT:    No acute soft tissue or bony abnormalities.  Slight scoliosis with degenerative spurring throughout the vertebral column.    Chest: Slight pleural and parenchymal scarring in the right upper lobe.  No acute lung abnormality.  Heart is normal in size.  Aortic arch calcification.  Mild tortuosity of the thoracic aorta.    Abdomen: The bowel gas pattern is normal.  Significant stool is present in the right zan colon.  There are no air-fluid levels present. Sophie Mettle is no pneumoperitoneum.  The renal and psoas margins are normal.  No renal or ureteral calculi are demonstrated.  No gallstones are identified. Sophie Mettle is no organ enlargement. \"    Pt is here today with his wife. He says he was confused about going to the hospital to get an XR and went to ER. However, he was also c/o chest pain (which his wife says she did not think he was complaining of, but pt thinks maybe from his stomach or the meds). He is no longer having chest symptoms and his cardiac work-up in the ER was unremarkable. He also never received the stool softener. Is taking two caps of miralax a night. Is now having some loose/soft stools but still feels it is not a good enough volume. Will get bloated and then feel better after BM.    He denies any abd pain, n/v, f/c, or other new sxs today. BP high, did not take his meds yet. ROS  See HPI. Past Medical History:   Diagnosis Date    Hypercholesterolemia     Hypertension     Prostate CA ([address] 2017    Ulcer, stomach peptic, acute        Past Surgical History:   Procedure Laterality Date    HX ACL RECONSTRUCTION Left        Allergies   Allergen Reactions    Terazosin Itching       Current Outpatient Medications   Medication Sig    polyethylene glycol (MIRALAX) 17 gram/dose powder Take 17 g by mouth daily.  trospium (SANCTURA) 20 mg tablet Take 20 mg by mouth Before breakfast, lunch, and dinner.  sildenafil citrate (Viagra) 50 mg tablet Take 1 Tab by mouth as needed for Other (ED).  albuterol (PROVENTIL HFA, VENTOLIN HFA, PROAIR HFA) 90 mcg/actuation inhaler Take 2 Puffs by inhalation every four (4) hours as needed for Wheezing.  metoprolol succinate (TOPROL-XL) 50 mg XL tablet Take  by mouth daily.  tamsulosin (FLOMAX) 0.4 mg capsule Take 0.4 mg by mouth daily.  omeprazole (PRILOSEC) 20 mg capsule Take 20 mg by mouth daily.  hydroCHLOROthiazide (HYDRODIURIL) 12.5 mg tablet TAKE 1 TABLET BY MOUTH ONCE DAILY (Patient taking differently: 25 mg.)    acetaminophen (TYLENOL) 325 mg tablet Take  by mouth every four (4) hours as needed for Pain.  atorvastatin (LIPITOR) 10 mg tablet Take 1 Tab by mouth daily. (Patient taking differently: Take 10 mg by mouth daily. Takes half of a 20mg)    cholecalciferol (VITAMIN D3) 1,000 unit cap Take  by mouth daily. No current facility-administered medications for this visit.        Social History     Socioeconomic History    Marital status:      Spouse name: Not on file    Number of children: 11    Years of education: Not on file    Highest education level: Not on file   Occupational History    Occupation:      Comment: retired   Tobacco Use    Smoking status: Never Smoker    Smokeless tobacco: Never Used Vaping Use    Vaping Use: Never assessed   Substance and Sexual Activity    Alcohol use: No    Drug use: No    Sexual activity: Not Currently     Partners: Female     Birth control/protection: None     Social Determinants of Health     Financial Resource Strain:     Difficulty of Paying Living Expenses:    Food Insecurity:     Worried About Running Out of Food in the Last Year:     [address] in the Last Year:    Transportation Needs:     Lack of Transportation (Medical):  Lack of Transportation (Non-Medical):    Physical Activity:     Days of Exercise per Week:     Minutes of Exercise per Session:    Stress:     Feeling of Stress :    Social Connections:     Frequency of Communication with Friends and Family:     Frequency of Social Gatherings with Friends and Family:     Attends Latter-day Services:     Active Member of Clubs or Organizations:     Attends Club or Organization Meetings:     Marital Status:        Family History   Problem Relation Age of Onset    Heart Disease Mother     No Known Problems Father     Heart Disease Sister     Hypertension Sister     Cancer Brother         prostate    Hypertension Brother     Heart Disease Sister     Heart Disease Sister     Hypertension Brother     Heart Disease Brother     Hypertension Brother          Physical Exam:  Visit Vitals  BP (!) 162/112 (BP 1 Location: Left upper arm, BP Patient Position: Sitting, BP Cuff Size: Adult)   Pulse 68   Temp 98 °F (36.7 °C) (Oral)   Resp 18   Ht 5' 7\" (1.702 m)   Wt 211 lb (95.7 kg)   SpO2 98%   BMI 33.05 kg/m²     Physical Exam  Vitals and nursing note reviewed. Constitutional:       Appearance: Normal appearance. He is obese. Comments: CASH DREW:      Head: Normocephalic and atraumatic. Cardiovascular:      Rate and Rhythm: Normal rate and regular rhythm. Pulses: Normal pulses. Heart sounds: Normal heart sounds.    Pulmonary:      Effort: Pulmonary effort is normal. Breath sounds: Normal breath sounds. Abdominal:      Palpations: Abdomen is soft. Tenderness: There is no abdominal tenderness. There is no guarding or rebound. Musculoskeletal:         General: No swelling. Cervical back: Neck supple. Skin:     General: Skin is warm and dry. Neurological:      Mental Status: He is alert and oriented to person, place, and time. Gait: Gait is intact. Psychiatric:         Mood and Affect: Mood and affect normal.         Assessment/Plan:    ICD-10-CM ICD-9-CM    1. Constipation, unspecified constipation type  K59.00 564.00    2. Essential hypertension  I10 401.9      Reiterated fluid intake and the medication options and home remedies/diet changes for constipation. rec pt f/u again next week if still not getting relief, sooner if anything new or worsening. Also needs to recheck his BP at home and rtc if it is not coming back down. If he does not need to return for constipation, I asked he at least come by for nurse visit to ensure his BP is back down with meds. If not, he will need to f/u with PCP for potential medication adjustments. Handouts provided. To ER for any \"red flag\" sxs in interim. Pt verbalizes understanding and agrees with the plan.     Will Anaya PA-C

## 2021-07-29 NOTE — PATIENT INSTRUCTIONS
Constipation: Care Instructions  Your Care Instructions     Constipation means that you have a hard time passing stools (bowel movements). People pass stools from 3 times a day to once every 3 days. What is normal for you may be different. Constipation may occur with pain in the rectum and cramping. The pain may get worse when you try to pass stools. Sometimes there are small amounts of bright red blood on toilet paper or the surface of stools. This is because of enlarged veins near the rectum (hemorrhoids). A few changes in your diet and lifestyle may help you avoid ongoing constipation. Your doctor may also prescribe medicine to help loosen your stool. Some medicines can cause constipation. These include pain medicines and antidepressants. Tell your doctor about all the medicines you take. Your doctor may want to make a medicine change to ease your symptoms. Follow-up care is a key part of your treatment and safety. Be sure to make and go to all appointments, and call your doctor if you are having problems. It's also a good idea to know your test results and keep a list of the medicines you take. How can you care for yourself at home? · Drink plenty of fluids. If you have kidney, heart, or liver disease and have to limit fluids, talk with your doctor before you increase the amount of fluids you drink. · Include high-fiber foods in your diet each day. These include fruits, vegetables, beans, and whole grains. · Get at least 30 minutes of exercise on most days of the week. Walking is a good choice. You also may want to do other activities, such as running, swimming, cycling, or playing tennis or team sports. · Take a fiber supplement, such as Citrucel or Metamucil, every day. Read and follow all instructions on the label. · Schedule time each day for a bowel movement. A daily routine may help. Take your time having your bowel movement.   · Support your feet with a small step stool when you sit on the toilet. This helps flex your hips and places your pelvis in a squatting position. · Your doctor may recommend an over-the-counter laxative to relieve your constipation. Examples are Milk of Magnesia and MiraLax. Read and follow all instructions on the label. Do not use laxatives on a long-term basis. When should you call for help? Call your doctor now or seek immediate medical care if:    · You have new or worse belly pain.     · You have new or worse nausea or vomiting.     · You have blood in your stools. Watch closely for changes in your health, and be sure to contact your doctor if:    · Your constipation is getting worse.     · You do not get better as expected. Where can you learn more? Go to http://www.sterling.com/  Enter P343 in the search box to learn more about \"Constipation: Care Instructions. \"  Current as of: February 26, 2020               Content Version: 12.8  © 8051-1070 [employer]. Care instructions adapted under license by [employer] (which disclaims liability or warranty for this information). If you have questions about a medical condition or this instruction, always ask your healthcare professional. [place] [address] any warranty or liability for your use of this information. High Blood Pressure: Care Instructions  Overview     It's normal for blood pressure to go up and down throughout the day. But if it stays up, you have high blood pressure. Another name for high blood pressure is hypertension. Despite what a lot of people think, high blood pressure usually doesn't cause headaches or make you feel dizzy or lightheaded. It usually has no symptoms. But it does increase your risk of stroke, heart attack, and other problems. You and your doctor will talk about your risks of these problems based on your blood pressure. Your doctor will give you a goal for your blood pressure.  Your goal will be based on your health and your age. Lifestyle changes, such as eating healthy and being active, are always important to help lower blood pressure. You might also take medicine to reach your blood pressure goal.  Follow-up care is a key part of your treatment and safety. Be sure to make and go to all appointments, and call your doctor if you are having problems. It's also a good idea to know your test results and keep a list of the medicines you take. How can you care for yourself at home? Medical treatment  · If you stop taking your medicine, your blood pressure will go back up. You may take one or more types of medicine to lower your blood pressure. Be safe with medicines. Take your medicine exactly as prescribed. Call your doctor if you think you are having a problem with your medicine. · Talk to your doctor before you start taking aspirin every day. Aspirin can help certain people lower their risk of a heart attack or stroke. But taking aspirin isn't right for everyone, because it can cause serious bleeding. · See your doctor regularly. You may need to see the doctor more often at first or until your blood pressure comes down. · If you are taking blood pressure medicine, talk to your doctor before you take decongestants or anti-inflammatory medicine, such as ibuprofen. Some of these medicines can raise blood pressure. · Learn how to check your blood pressure at home. Lifestyle changes  · Stay at a healthy weight. This is especially important if you put on weight around the waist. Losing even 10 pounds can help you lower your blood pressure. · If your doctor recommends it, get more exercise. Walking is a good choice. Bit by bit, increase the amount you walk every day. Try for at least 30 minutes on most days of the week. You also may want to swim, bike, or do other activities. · Avoid or limit alcohol. Talk to your doctor about whether you can drink any alcohol.   · Try to limit how much sodium you eat to less than 2,300 milligrams (mg) a day. Your doctor may ask you to try to eat less than 1,500 mg a day. · Eat plenty of fruits (such as bananas and oranges), vegetables, legumes, whole grains, and low-fat dairy products. · Lower the amount of saturated fat in your diet. Saturated fat is found in animal products such as milk, cheese, and meat. Limiting these foods may help you lose weight and also lower your risk for heart disease. · Do not smoke. Smoking increases your risk for heart attack and stroke. If you need help quitting, talk to your doctor about stop-smoking programs and medicines. These can increase your chances of quitting for good. When should you call for help? Call  911 anytime you think you may need emergency care. This may mean having symptoms that suggest that your blood pressure is causing a serious heart or blood vessel problem. Your blood pressure may be over 180/120. For example, call 911 if:    · You have symptoms of a heart attack. These may include:  ? Chest pain or pressure, or a strange feeling in the chest.  ? Sweating. ? Shortness of breath. ? Nausea or vomiting. ? Pain, pressure, or a strange feeling in the back, neck, jaw, or upper belly or in one or both shoulders or arms. ? Lightheadedness or sudden weakness. ? A fast or irregular heartbeat.     · You have symptoms of a stroke. These may include:  ? Sudden numbness, tingling, weakness, or loss of movement in your face, arm, or leg, especially on only one side of your body. ? Sudden vision changes. ? Sudden trouble speaking. ? Sudden confusion or trouble understanding simple statements. ? Sudden problems with walking or balance. ? A sudden, severe headache that is different from past headaches.     · You have severe back or belly pain. Do not wait until your blood pressure comes down on its own. Get help right away.   Call your doctor now or seek immediate care if:    · Your blood pressure is much higher than normal (such as 180/120 or higher), but you don't have symptoms.     · You think high blood pressure is causing symptoms, such as:  ? Severe headache.  ? Blurry vision. Watch closely for changes in your health, and be sure to contact your doctor if:    · Your blood pressure measures higher than your doctor recommends at least 2 times. That means the top number is higher or the bottom number is higher, or both.     · You think you may be having side effects from your blood pressure medicine. Where can you learn more? Go to http://www.gray.com/  Enter [id number] in the search box to learn more about \"High Blood Pressure: Care Instructions. \"  Current as of: August 31, 2020               Content Version: 12.8  © 2006-2021 [employer]. Care instructions adapted under license by [employer] (which disclaims liability or warranty for this information). If you have questions about a medical condition or this instruction, always ask your healthcare professional. [place] [address] any warranty or liability for your use of this information. DASH Diet: Care Instructions  Your Care Instructions     The DASH diet is an eating plan that can help lower your blood pressure. DASH stands for Dietary Approaches to Stop Hypertension. Hypertension is high blood pressure. The DASH diet focuses on eating foods that are high in calcium, potassium, and magnesium. These nutrients can lower blood pressure. The foods that are highest in these nutrients are fruits, vegetables, low-fat dairy products, nuts, seeds, and legumes. But taking calcium, potassium, and magnesium supplements instead of eating foods that are high in those nutrients does not have the same effect. The DASH diet also includes whole grains, fish, and poultry. The DASH diet is one of several lifestyle changes your doctor may recommend to lower your high blood pressure.  Your doctor may also want you to decrease the amount of sodium in your diet. Lowering sodium while following the DASH diet can lower blood pressure even further than just the DASH diet alone. Follow-up care is a key part of your treatment and safety. Be sure to make and go to all appointments, and call your doctor if you are having problems. It's also a good idea to know your test results and keep a list of the medicines you take. How can you care for yourself at home? Following the DASH diet  · Eat 4 to 5 servings of fruit each day. A serving is 1 medium-sized piece of fruit, ½ cup chopped or canned fruit, 1/4 cup dried fruit, or 4 ounces (½ cup) of fruit juice. Choose fruit more often than fruit juice. · Eat 4 to 5 servings of vegetables each day. A serving is 1 cup of lettuce or raw leafy vegetables, ½ cup of chopped or cooked vegetables, or 4 ounces (½ cup) of vegetable juice. Choose vegetables more often than vegetable juice. · Get 2 to 3 servings of low-fat and fat-free dairy each day. A serving is 8 ounces of milk, 1 cup of yogurt, or 1 ½ ounces of cheese. · Eat 6 to 8 servings of grains each day. A serving is 1 slice of bread, 1 ounce of dry cereal, or ½ cup of cooked rice, pasta, or cooked cereal. Try to choose whole-grain products as much as possible. · Limit lean meat, poultry, and fish to 2 servings each day. A serving is 3 ounces, about the size of a deck of cards. · Eat 4 to 5 servings of nuts, seeds, and legumes (cooked dried beans, lentils, and split peas) each week. A serving is 1/3 cup of nuts, 2 tablespoons of seeds, or ½ cup of cooked beans or peas. · Limit fats and oils to 2 to 3 servings each day. A serving is 1 teaspoon of vegetable oil or 2 tablespoons of salad dressing. · Limit sweets and added sugars to 5 servings or less a week. A serving is 1 tablespoon jelly or jam, ½ cup sorbet, or 1 cup of lemonade. · Eat less than 2,300 milligrams (mg) of sodium a day.  If you limit your sodium to 1,500 mg a day, you can lower your blood pressure even more. · Be aware that all of these are the suggested number of servings for people who eat 1,800 to 2,000 calories a day. Your recommended number of servings may be different if you need more or fewer calories. Tips for success  · Start small. Do not try to make dramatic changes to your diet all at once. You might feel that you are missing out on your favorite foods and then be more likely to not follow the plan. Make small changes, and stick with them. Once those changes become habit, add a few more changes. · Try some of the following:  ? Make it a goal to eat a fruit or vegetable at every meal and at snacks. This will make it easy to get the recommended amount of fruits and vegetables each day. ? Try yogurt topped with fruit and nuts for a snack or healthy dessert. ? Add lettuce, tomato, cucumber, and onion to sandwiches. ? Combine a ready-made pizza crust with low-fat mozzarella cheese and lots of vegetable toppings. Try using tomatoes, squash, spinach, broccoli, carrots, cauliflower, and onions. ? Have a variety of cut-up vegetables with a low-fat dip as an appetizer instead of chips and dip. ? Sprinkle sunflower seeds or chopped almonds over salads. Or try adding chopped walnuts or almonds to cooked vegetables. ? Try some vegetarian meals using beans and peas. Add garbanzo or kidney beans to salads. Make burritos and tacos with mashed james beans or black beans. Where can you learn more? Go to http://www.Solairedirect.com/  Enter H967 in the search box to learn more about \"DASH Diet: Care Instructions. \"  Current as of: August 31, 2020               Content Version: 12.8  © 1166-3664 [employer]. Care instructions adapted under license by [employer] (which disclaims liability or warranty for this information).  If you have questions about a medical condition or this instruction, always ask your healthcare professional. [employer] disclaims any warranty or liability for your use of this information.

## 2021-11-12 LAB — Lab: NORMAL

## 2022-03-14 ENCOUNTER — OFFICE VISIT (OUTPATIENT)
Dept: FAMILY MEDICINE CLINIC | Age: 72
End: 2022-03-14
Payer: MEDICARE

## 2022-03-14 VITALS
HEIGHT: 67 IN | SYSTOLIC BLOOD PRESSURE: 138 MMHG | RESPIRATION RATE: 16 BRPM | TEMPERATURE: 97.3 F | WEIGHT: 212 LBS | HEART RATE: 79 BPM | BODY MASS INDEX: 33.27 KG/M2 | DIASTOLIC BLOOD PRESSURE: 88 MMHG | OXYGEN SATURATION: 95 %

## 2022-03-14 DIAGNOSIS — I10 ESSENTIAL HYPERTENSION: Primary | ICD-10-CM

## 2022-03-14 DIAGNOSIS — E78.2 MIXED HYPERLIPIDEMIA: ICD-10-CM

## 2022-03-14 PROCEDURE — G8752 SYS BP LESS 140: HCPCS | Performed by: FAMILY MEDICINE

## 2022-03-14 PROCEDURE — G8536 NO DOC ELDER MAL SCRN: HCPCS | Performed by: FAMILY MEDICINE

## 2022-03-14 PROCEDURE — G8417 CALC BMI ABV UP PARAM F/U: HCPCS | Performed by: FAMILY MEDICINE

## 2022-03-14 PROCEDURE — 1101F PT FALLS ASSESS-DOCD LE1/YR: CPT | Performed by: FAMILY MEDICINE

## 2022-03-14 PROCEDURE — G8510 SCR DEP NEG, NO PLAN REQD: HCPCS | Performed by: FAMILY MEDICINE

## 2022-03-14 PROCEDURE — 3017F COLORECTAL CA SCREEN DOC REV: CPT | Performed by: FAMILY MEDICINE

## 2022-03-14 PROCEDURE — G8754 DIAS BP LESS 90: HCPCS | Performed by: FAMILY MEDICINE

## 2022-03-14 PROCEDURE — G8427 DOCREV CUR MEDS BY ELIG CLIN: HCPCS | Performed by: FAMILY MEDICINE

## 2022-03-14 PROCEDURE — 99213 OFFICE O/P EST LOW 20 MIN: CPT | Performed by: FAMILY MEDICINE

## 2022-03-14 NOTE — PROGRESS NOTES
1. Have you been to the ER, urgent care clinic since your last visit? Hospitalized since your last visit? No    2. Have you seen or consulted any other health care providers outside of the [address] [place] [address] since your last visit? Include any pap smears or colon screening. Yes - [facility]/22 [facility]     3/14/2022      Chief Complaint   Patient presents with   [address] [facility] on 3/17/22    Eye Pain     Left         History of Present Illness:        Ty Bowles is a 66 y.o. male here to recheck BP. Having irridectomy and lense implant 3/17. Feels well. No change in meds, recently seen at [facility]. Allergies   Allergen Reactions    Terazosin Itching       Current Outpatient Medications   Medication Sig    trospium (SANCTURA) 20 mg tablet Take 20 mg by mouth Before breakfast, lunch, and dinner.  sildenafil citrate (Viagra) 50 mg tablet Take 1 Tab by mouth as needed for Other (ED).  metoprolol succinate (TOPROL-XL) 50 mg XL tablet Take  by mouth daily.  tamsulosin (FLOMAX) 0.4 mg capsule Take 0.4 mg by mouth daily.  omeprazole (PRILOSEC) 20 mg capsule Take 20 mg by mouth daily.  hydroCHLOROthiazide (HYDRODIURIL) 12.5 mg tablet TAKE 1 TABLET BY MOUTH ONCE DAILY (Patient taking differently: 25 mg.)    acetaminophen (TYLENOL) 325 mg tablet Take  by mouth every four (4) hours as needed for Pain.  atorvastatin (LIPITOR) 10 mg tablet Take 1 Tab by mouth daily. (Patient taking differently: Take 10 mg by mouth daily. Takes half of a 20mg)    cholecalciferol (VITAMIN D3) 1,000 unit cap Take  by mouth daily.  polyethylene glycol (MIRALAX) 17 gram/dose powder Take 17 g by mouth daily. (Patient not taking: Reported on 3/14/2022)    albuterol (PROVENTIL HFA, VENTOLIN HFA, PROAIR HFA) 90 mcg/actuation inhaler Take 2 Puffs by inhalation every four (4) hours as needed for Wheezing.  (Patient not taking: Reported on 3/14/2022)     No current facility-administered medications for this visit. Physical Examination:    Visit Vitals  /88   Pulse 79   Temp 97.3 °F (36.3 °C) (Oral)   Resp 16   Ht 5' 7\" (1.702 m)   Wt 212 lb (96.2 kg)   SpO2 95%   BMI 33.20 kg/m²      General:  Alert, cooperative, no distress. HEENT:  Normocephalic, without obvious abnormality, atraumatic. Conjunctivae/corneas clear. Pupils equal, round, reactive to light. Extraocular movements intact. TMs and external canals normal bilaterally. Nasal mucosa and oropharynx clear. Lungs: Clear to auscultation bilaterally. Chest wall:  No tenderness or deformity. Heart:  Regular rate and rhythm, S1, S2 normal, no murmur, click, rub, or gallop. Abdomen:   Soft, non-tender. Bowel sounds normal. No masses. No organomegaly. Extremities: Extremities normal, atraumatic, no cyanosis or edema. Pulses: 2+ and symmetric all extremities. Skin: Skin color, texture, turgor normal. No rashes or lesions. Lymph nodes: Cervical, supraclavicular, and axillary nodes normal.   Neurologic: CNII-XII intact. Normal strength, sensation, and reflexes throughout. ASSESSMENT AND PLAN    1. Essential hypertension  Satisfactory control. No contraindications to surgery    2. Mixed hyperlipidemia  stable            No orders of the defined types were placed in this encounter.           Dahlia Fine MD

## 2022-03-19 PROBLEM — Z85.46 HISTORY OF PROSTATE CANCER: Status: ACTIVE | Noted: 2018-04-25

## 2022-03-19 PROBLEM — J40 BRONCHITIS: Status: ACTIVE | Noted: 2018-04-25

## 2022-03-19 PROBLEM — K21.9 GASTROESOPHAGEAL REFLUX DISEASE: Status: ACTIVE | Noted: 2018-05-31

## 2022-03-19 PROBLEM — I10 ESSENTIAL HYPERTENSION: Status: ACTIVE | Noted: 2018-04-25

## 2022-03-19 PROBLEM — E78.5 HYPERLIPIDEMIA: Status: ACTIVE | Noted: 2018-04-25

## 2022-03-20 PROBLEM — Z86.11 HISTORY OF TB (TUBERCULOSIS): Status: ACTIVE | Noted: 2018-04-25

## 2022-03-20 PROBLEM — Z98.890 HISTORY OF LEFT KNEE SURGERY: Status: ACTIVE | Noted: 2018-08-30

## 2022-04-15 ENCOUNTER — TELEPHONE (OUTPATIENT)
Dept: FAMILY MEDICINE CLINIC | Age: 72
End: 2022-04-15

## 2022-04-15 NOTE — TELEPHONE ENCOUNTER
----- Message from Hannah Jorgensen sent at 4/15/2022 12:29 PM EDT -----  Subject: Message to Provider    QUESTIONS  Information for Provider? Representative from [address] contacted wanting to   know last visit date and info on last colonoscopy. ' [phone] leave   message on voicemail   ---------------------------------------------------------------------------  --------------  [address]  What is the best way for the office to contact you? OK to leave message on   voicemail  Preferred Call Back Phone Number? [phone]  ---------------------------------------------------------------------------  --------------  SCRIPT ANSWERS  Relationship to Patient? Third Party  Third Party Type? Insurance? Representative Name?  Lynnette [phone]

## 2022-04-18 NOTE — TELEPHONE ENCOUNTER
I have attempted to call this phone number given for [place] #3 times. Each time I get a message that the # has been disconnected or is no longer in service.

## 2023-01-12 ENCOUNTER — DOCUMENTATION ONLY (OUTPATIENT)
Dept: FAMILY MEDICINE CLINIC | Age: 73
End: 2023-01-12

## 2023-01-12 NOTE — PROGRESS NOTES
Received fax from the [facility] [employer], no colonoscopy report on file.

## 2023-03-10 ENCOUNTER — OFFICE VISIT (OUTPATIENT)
Dept: FAMILY MEDICINE CLINIC | Age: 73
End: 2023-03-10

## 2023-03-10 VITALS
HEIGHT: 67 IN | HEART RATE: 88 BPM | BODY MASS INDEX: 34.06 KG/M2 | OXYGEN SATURATION: 94 % | SYSTOLIC BLOOD PRESSURE: 135 MMHG | TEMPERATURE: 96.9 F | RESPIRATION RATE: 16 BRPM | DIASTOLIC BLOOD PRESSURE: 86 MMHG | WEIGHT: 217 LBS

## 2023-03-10 DIAGNOSIS — Z00.00 MEDICARE ANNUAL WELLNESS VISIT, SUBSEQUENT: Primary | ICD-10-CM

## 2023-03-10 DIAGNOSIS — K21.9 GERD WITHOUT ESOPHAGITIS: ICD-10-CM

## 2023-03-10 DIAGNOSIS — I10 ESSENTIAL HYPERTENSION: ICD-10-CM

## 2023-03-10 DIAGNOSIS — E78.5 HYPERLIPIDEMIA, UNSPECIFIED HYPERLIPIDEMIA TYPE: ICD-10-CM

## 2023-03-10 RX ORDER — OMEPRAZOLE 20 MG/1
20 CAPSULE, DELAYED RELEASE ORAL DAILY
Qty: 90 CAPSULE | Refills: 1 | Status: SHIPPED | OUTPATIENT
Start: 2023-03-10

## 2023-03-10 RX ORDER — HYDROCHLOROTHIAZIDE 25 MG/1
25 TABLET ORAL DAILY
Qty: 90 TABLET | Refills: 1 | Status: SHIPPED | OUTPATIENT
Start: 2023-03-10

## 2023-03-10 RX ORDER — ATORVASTATIN CALCIUM 10 MG/1
10 TABLET, FILM COATED ORAL DAILY
Qty: 90 TABLET | Refills: 1 | Status: SHIPPED | OUTPATIENT
Start: 2023-03-10

## 2023-03-10 RX ORDER — MELOXICAM 7.5 MG/1
7.5 TABLET ORAL DAILY
Qty: 90 TABLET | Refills: 1 | Status: SHIPPED | OUTPATIENT
Start: 2023-03-10

## 2023-03-10 NOTE — PATIENT INSTRUCTIONS
Medicare Wellness Visit, Male    The best way to live healthy is to have a lifestyle where you eat a well-balanced diet, exercise regularly, limit alcohol use, and quit all forms of tobacco/nicotine, if applicable. Regular preventive services are another way to keep healthy. Preventive services (vaccines, screening tests, monitoring & exams) can help personalize your care plan, which helps you manage your own care. Screening tests can find health problems at the earliest stages, when they are easiest to treat. [facility] follows the current, evidence-based guidelines published by the [facility] D[facility] Nupur ([facility]) when recommending preventive services for our patients. Because we follow these guidelines, sometimes recommendations change over time as research supports it. (For example, a prostate screening blood test is no longer routinely recommended for men with no symptoms). Of course, you and your doctor may decide to screen more often for some diseases, based on your risk and co-morbidities (chronic disease you are already diagnosed with). Preventive services for you include:  - Medicare offers their members a free annual wellness visit, which is time for you and your primary care provider to discuss and plan for your preventive service needs.  Take advantage of this benefit every year!    -Over the age of 72 should receive the recommended pneumonia vaccines.   -All adults should have a flu vaccine yearly.  -All adults should receive a tetanus vaccine every 10 years.  -Over the age of 48 should receive the shingles vaccines.    -All adults should be screened once for Hepatitis C.  -All adults age 38-68 who are overweight should have a diabetes screening test once every three years.   -Other screening tests & preventive services for persons with diabetes include: an eye exam to screen for diabetic retinopathy, a kidney function test, a foot exam, and stricter control over your cholesterol.   -Cardiovascular screening for adults with routine risk involves an electrocardiogram (ECG) at intervals determined by the provider.     -Colorectal cancer screening should be done for adults age 43-69 with no increased risk factors for colorectal cancer. There are a number of acceptable methods of screening for this type of cancer. Each test has its own benefits and drawbacks. Discuss with your provider what is most appropriate for you during your annual wellness visit. The different tests include: colonoscopy (considered the best screening method), a fecal occult blood test, a fecal DNA test, and sigmoidoscopy.    -Lung cancer screening is recommended annually with a low dose CT scan for adults between age 54 and 68, who have smoked at least 30 pack years (equivalent of 1 pack per day for 30 days), and who is a current smoker or quit less than 15 years ago. -An Abdominal Aortic Aneurysm (AAA) Screening is recommended for men age 73-68 who has ever smoked in their lifetime.      Here is a list of your current Health Maintenance items (your personalized list of preventive services) with a due date:  Health Maintenance Due   Topic Date Due    DTaP/Tdap/Td  (1 - Tdap) Never done    Cholesterol Test   03/02/2021    COVID-19 Vaccine (3 - Booster for Pfizer series) 04/26/2021    Shingles Vaccine (2 of 2) 12/30/2021    Yearly Flu Vaccine (1) 08/01/2022    Depresssion Screening  03/14/2023

## 2023-03-10 NOTE — PROGRESS NOTES
1. \"Have you been to the ER, urgent care clinic since your last visit? Hospitalized since your last visit? \" No    2. \"Have you seen or consulted any other health care providers outside of the [address] [place] [address] since your last visit? \" No     3. For patients aged 39-70: Has the patient had a colonoscopy / FIT/ Cologuard? Yes - no Care Gap present      If the patient is female:    4. For patients aged 41-77: Has the patient had a mammogram within the past 2 years? NA - based on age or sex      11. For patients aged 21-65: Has the patient had a pap smear? NA - based on age or sex      Functional Ability:   Does the patient exhibit a steady gait? yes   How long did it take the patient to get up and walk from a sitting position? 12 seconds   Is the patient self reliant?  (ie can do own laundry, meals, household chores)  yes     Does the patient handle his/her own medications? Yes, wife assist      Does the patient handle his/her own money? yes     Is the patients home safe (ie good lighting, handrails on stairs and bath, etc.)? yes     Did you notice or did patient express any hearing difficulties? yes     Did you notice or did patient express any vision difficulties?   no     Were distance and reading eye charts used? no       Advance Care Planning:   Patient was offered the opportunity to discuss advance care planning:  yes     Does patient have an Advance Directive:  no   If no, did you provide information on Caring Connections?   yes     ADL Assessment 7/29/2021   Feeding yourself No Help Needed   Getting from bed to chair No Help Needed   Getting dressed No Help Needed   Bathing or showering No Help Needed   Walk across the room (includes cane/walker) No Help Needed   Using the telphone No Help Needed   Taking your medications No Help Needed   Preparing meals No Help Needed   Managing money (expenses/bills) No Help Needed   Moderately strenuous housework (laundry) No Help Needed   Shopping for personal items (toiletries/medicines) No Help Needed   Shopping for groceries No Help Needed   Driving No Help Needed   Climbing a flight of stairs No Help Needed   Getting to places beyond walking distances No Help Needed       Abuse Screening Questionnaire 7/29/2021   Do you ever feel afraid of your partner? N   Are you in a relationship with someone who physically or mentally threatens you? N   Is it safe for you to go home? Y        This is the Subsequent Medicare Annual Wellness Exam, performed 12 months or more after the Initial AWV or the last Subsequent AWV    I have reviewed the patient's medical history in detail and updated the computerized patient record. Assessment/Plan   Education and counseling provided:  Are appropriate based on today's review and evaluation    1. Medicare annual wellness visit, subsequent     Depression Risk Factor Screening     3 most recent PHQ Screens 3/14/2022   Little interest or pleasure in doing things Not at all   Feeling down, depressed, irritable, or hopeless Not at all   Total Score PHQ 2 0   Poor appetite, weight loss, or overeating -       Alcohol & Drug Abuse Risk Screen    Do you average more than 1 drink per night or more than 7 drinks a week: No    In the past three months have you have had more than 4 drinks containing alcohol on one occasion: No          Functional Ability and Level of Safety    Hearing: Hearing is good. The patient wears hearing aids. Activities of Daily Living: The home contains: no safety equipment. Patient does total self care      Ambulation: with no difficulty     Fall Risk:  Fall Risk Assessment, last 12 mths 7/29/2021   Able to walk? Yes   Fall in past 12 months? 0   Do you feel unsteady?  0   Are you worried about falling 0      Abuse Screen:  Patient is not abused       Cognitive Screening    Has your family/caregiver stated any concerns about your memory: no     Cognitive Screening: Normal - Clock Drawing Test    Health Maintenance Due     Health Maintenance Due   Topic Date Due    DTaP/Tdap/Td series (1 - Tdap) Never done    Lipid Screen  03/02/2021    COVID-19 Vaccine (3 - Booster for Pfizer series) 04/26/2021    Shingles Vaccine (2 of 2) 12/30/2021    Flu Vaccine (1) 08/01/2022    Depression Screen  03/14/2023       Patient Care Team   Patient Care Team:  Josie Tran MD as PCP - General (Palliative Medicine)  Josie Tran MD as PCP - [facility]    History     Patient Active Problem List   Diagnosis Code    Bronchitis J40    History of TB (tuberculosis) Z86.11    Essential hypertension I10    Hyperlipidemia E78.5    BMI 32.0-32.9,adult Z68.32    History of prostate cancer Z85.46    Gastroesophageal reflux disease K21.9    History of left knee surgery Z98.890     Past Medical History:   Diagnosis Date    Hypercholesterolemia     Hypertension     Prostate CA ([facility] [address] 2017    Ulcer, stomach peptic, acute       Past Surgical History:   Procedure Laterality Date    HX ACL RECONSTRUCTION Left      Current Outpatient Medications   Medication Sig Dispense Refill    polyethylene glycol (MIRALAX) 17 gram/dose powder Take 17 g by mouth daily. (Patient not taking: Reported on 3/14/2022) 289 g 0    trospium (SANCTURA) 20 mg tablet Take 20 mg by mouth Before breakfast, lunch, and dinner. sildenafil citrate (Viagra) 50 mg tablet Take 1 Tab by mouth as needed for Other (ED). 10 Tab 5    albuterol (PROVENTIL HFA, VENTOLIN HFA, PROAIR HFA) 90 mcg/actuation inhaler Take 2 Puffs by inhalation every four (4) hours as needed for Wheezing. (Patient not taking: Reported on 3/14/2022) 1 Inhaler 1    metoprolol succinate (TOPROL-XL) 50 mg XL tablet Take  by mouth daily. tamsulosin (FLOMAX) 0.4 mg capsule Take 0.4 mg by mouth daily. omeprazole (PRILOSEC) 20 mg capsule Take 20 mg by mouth daily.       hydroCHLOROthiazide (HYDRODIURIL) 12.5 mg tablet TAKE 1 TABLET BY MOUTH ONCE DAILY (Patient taking differently: 25 mg.) 90 Tab 0    acetaminophen (TYLENOL) 325 mg tablet Take  by mouth every four (4) hours as needed for Pain. atorvastatin (LIPITOR) 10 mg tablet Take 1 Tab by mouth daily. (Patient taking differently: Take 10 mg by mouth daily. Takes half of a 20mg) 90 Tab 0    cholecalciferol (VITAMIN D3) 1,000 unit cap Take  by mouth daily.        Allergies   Allergen Reactions    Terazosin Itching       Family History   Problem Relation Age of Onset    Heart Disease Mother     No Known Problems Father     Heart Disease Sister     Hypertension Sister     Cancer Brother         prostate    Hypertension Brother     Heart Disease Sister     Heart Disease Sister     Hypertension Brother     Heart Disease Brother     Hypertension Brother      Social History     Tobacco Use    Smoking status: Never    Smokeless tobacco: Never   Substance Use Topics    Alcohol use: No         Ivan Holt LPN

## 2023-03-10 NOTE — PROGRESS NOTES
3/10/2023      Chief Complaint   Patient presents with    Annual Wellness Visit    Back Pain         History of Present Illness:        Vale Spivey is a 67 y.o. male returns doing well except for low back pain. Unclear which meds he receives through [place] and which we are prescribing. Sees [facility] next month with labs. Allergies   Allergen Reactions    Terazosin Itching and Vertigo       Current Outpatient Medications   Medication Sig    atorvastatin (LIPITOR) 10 mg tablet Take 1 Tablet by mouth daily. hydroCHLOROthiazide (HYDRODIURIL) 25 mg tablet Take 1 Tablet by mouth daily. meloxicam (MOBIC) 7.5 mg tablet Take 1 Tablet by mouth daily. omeprazole (PRILOSEC) 20 mg capsule Take 1 Capsule by mouth daily. trospium (SANCTURA) 20 mg tablet Take 20 mg by mouth Before breakfast, lunch, and dinner. albuterol (PROVENTIL HFA, VENTOLIN HFA, PROAIR HFA) 90 mcg/actuation inhaler Take 2 Puffs by inhalation every four (4) hours as needed for Wheezing. metoprolol succinate (TOPROL-XL) 50 mg XL tablet Take  by mouth daily. tamsulosin (FLOMAX) 0.4 mg capsule Take 0.4 mg by mouth daily. acetaminophen (TYLENOL) 325 mg tablet Take  by mouth every four (4) hours as needed for Pain.    polyethylene glycol (MIRALAX) 17 gram/dose powder Take 17 g by mouth daily. (Patient not taking: No sig reported)    sildenafil citrate (Viagra) 50 mg tablet Take 1 Tab by mouth as needed for Other (ED). (Patient not taking: Reported on 3/10/2023)    cholecalciferol (VITAMIN D3) 25 mcg (1,000 unit) cap Take  by mouth daily. (Patient not taking: Reported on 3/10/2023)     No current facility-administered medications for this visit. Physical Examination:    Visit Vitals  /86   Pulse 88   Temp 96.9 °F (36.1 °C) (Oral)   Resp 16   Ht 5' 7\" (1.702 m)   Wt 217 lb (98.4 kg)   SpO2 94%   BMI 33.99 kg/m²      General:  Alert, cooperative, no distress.    HEENT:  Normocephalic, without obvious abnormality, atraumatic. Conjunctivae/corneas clear. Pupils equal, round, reactive to light. Extraocular movements intact. TMs and external canals normal bilaterally. Nasal mucosa and oropharynx clear. Lungs: Clear to auscultation bilaterally. Chest wall:  No tenderness or deformity. Heart:  Regular rate and rhythm, S1, S2 normal, no murmur, click, rub, or gallop. Abdomen:   Soft, non-tender. Bowel sounds normal. No masses. No organomegaly. Extremities: Extremities normal, atraumatic, no cyanosis or edema. Pulses: 2+ and symmetric all extremities. Skin: Skin color, texture, turgor normal. No rashes or lesions. Lymph nodes: Cervical, supraclavicular, and axillary nodes normal.   Neurologic: CNII-XII intact. Normal strength, sensation, and reflexes throughout. ASSESSMENT AND PLAN    1. Medicare annual wellness visit, subsequent      2. Hyperlipidemia, unspecified hyperlipidemia type    - atorvastatin (LIPITOR) 10 mg tablet; Take 1 Tablet by mouth daily. Dispense: 90 Tablet; Refill: 1    3. Essential hypertension    - hydroCHLOROthiazide (HYDRODIURIL) 25 mg tablet; Take 1 Tablet by mouth daily. Dispense: 90 Tablet; Refill: 1    4. GERD without esophagitis    - omeprazole (PRILOSEC) 20 mg capsule; Take 1 Capsule by mouth daily. Dispense: 90 Capsule; Refill: 1            Orders Placed This Encounter    atorvastatin (LIPITOR) 10 mg tablet     Sig: Take 1 Tablet by mouth daily. Dispense:  90 Tablet     Refill:  1    hydroCHLOROthiazide (HYDRODIURIL) 25 mg tablet     Sig: Take 1 Tablet by mouth daily. Dispense:  90 Tablet     Refill:  1     Please consider 90 day supplies to promote better adherence. Needs follow up in Sept/Oct    meloxicam (MOBIC) 7.5 mg tablet     Sig: Take 1 Tablet by mouth daily. Dispense:  90 Tablet     Refill:  1    omeprazole (PRILOSEC) 20 mg capsule     Sig: Take 1 Capsule by mouth daily.      Dispense:  90 Capsule     Refill:  1           Kizzy Don MD

## 2023-05-01 ENCOUNTER — OFFICE VISIT (OUTPATIENT)
Dept: FAMILY MEDICINE CLINIC | Age: 73
End: 2023-05-01
Payer: MEDICARE

## 2023-05-01 VITALS
HEART RATE: 81 BPM | DIASTOLIC BLOOD PRESSURE: 92 MMHG | TEMPERATURE: 96.9 F | BODY MASS INDEX: 33.9 KG/M2 | WEIGHT: 216 LBS | SYSTOLIC BLOOD PRESSURE: 157 MMHG | OXYGEN SATURATION: 92 % | RESPIRATION RATE: 16 BRPM | HEIGHT: 67 IN

## 2023-05-01 DIAGNOSIS — L91.8 SKIN TAG: Primary | ICD-10-CM

## 2023-05-01 PROCEDURE — 11200 RMVL SKIN TAGS UP TO&INC 15: CPT | Performed by: FAMILY MEDICINE

## 2023-05-01 PROCEDURE — 3080F DIAST BP >= 90 MM HG: CPT | Performed by: FAMILY MEDICINE

## 2023-05-01 PROCEDURE — 99213 OFFICE O/P EST LOW 20 MIN: CPT | Performed by: FAMILY MEDICINE

## 2023-05-01 PROCEDURE — G8427 DOCREV CUR MEDS BY ELIG CLIN: HCPCS | Performed by: FAMILY MEDICINE

## 2023-05-01 PROCEDURE — 1101F PT FALLS ASSESS-DOCD LE1/YR: CPT | Performed by: FAMILY MEDICINE

## 2023-05-01 PROCEDURE — 3017F COLORECTAL CA SCREEN DOC REV: CPT | Performed by: FAMILY MEDICINE

## 2023-05-01 PROCEDURE — G8510 SCR DEP NEG, NO PLAN REQD: HCPCS | Performed by: FAMILY MEDICINE

## 2023-05-01 PROCEDURE — G8417 CALC BMI ABV UP PARAM F/U: HCPCS | Performed by: FAMILY MEDICINE

## 2023-05-01 PROCEDURE — 1123F ACP DISCUSS/DSCN MKR DOCD: CPT | Performed by: FAMILY MEDICINE

## 2023-05-01 PROCEDURE — G8536 NO DOC ELDER MAL SCRN: HCPCS | Performed by: FAMILY MEDICINE

## 2023-05-01 PROCEDURE — 3077F SYST BP >= 140 MM HG: CPT | Performed by: FAMILY MEDICINE

## 2023-05-01 NOTE — PROGRESS NOTES
YES Answers must have Comments  1. \"Have you been to the ER, urgent care clinic since your last visit? Hospitalized since your last visit? \"    [] YES   [x] NO       2. Have you seen or consulted any other health care providers outside of [address] [place] [address] since your last visit?     [] YES   [x] NO       3. For patients aged 39-70: Have you had a colorectal cancer screening such as a colonoscopy/FIT/Cologuard? Nurse/CMA to request records if not in chart   [x] YES   [] NO   [] NA, based on age    If the patient is female:      4. For female patients aged 41-77: Kiesha Sers you had a mammogram in the last two years?  Nurse/CMA to request records if not in chart   [] YES   [] NO   [x] NA, based on age    11. For female patients aged 21-65: Kiesha Sers you had a pap smear?   Nurse/CMA to request records if not in chart   [] YES   [] NO  [x] NA, based on age        [facility] NOTE        Chart reviewed for the following:   Dalia Cunningham LPN, have reviewed the History, Physical and updated the Allergic reactions for [facility] performed immediately prior to start of procedure:   Dalia Cunningham LPN, have performed the following reviews on Miguel Sparks prior to the start of the procedure:            * Patient was identified by name and date of birth   * Agreement on procedure being performed was verified  * Risks and Benefits explained to the patient by Carleen Mathur MD  * Procedure site verified and marked as necessary  * Patient was positioned for comfort  * Consent was signed and verified     Time: 4:40      Date of procedure: 5/1/2023    Procedure performed by:  Carleen Mathur MD    Provider assisted by: Sylvester Sevilla LPN    Patient assisted by: self    Pre Procedural Pain Scale: 5    Procedure start time:  4:45 pm    Procedure end time:  4:50 pm    How tolerated by patient: tolerated the procedure well with no complications    Post Procedural Pain Scale:  5    Comments: Lesion Excision     Post op instructions and patient education reviewed. Patient states understanding. Copy of discharge instructions given to patient in AVS.        5/1/2023      Chief Complaint   Patient presents with    Skin Problem     Left Eye          History of Present Illness:        Michelle Abbott is a 67 y.o. male with pedunculated, irritated skin tag below R eye. Is constantly irritated due to size and location. Allergies   Allergen Reactions    Terazosin Itching and Vertigo       Current Outpatient Medications   Medication Sig    atorvastatin (LIPITOR) 10 mg tablet Take 1 Tablet by mouth daily. hydroCHLOROthiazide (HYDRODIURIL) 25 mg tablet Take 1 Tablet by mouth daily. omeprazole (PRILOSEC) 20 mg capsule Take 1 Capsule by mouth daily. trospium (SANCTURA) 20 mg tablet Take 1 Tablet by mouth Before breakfast, lunch, and dinner. metoprolol succinate (TOPROL-XL) 50 mg XL tablet Take  by mouth daily. tamsulosin (FLOMAX) 0.4 mg capsule Take 1 Capsule by mouth daily. acetaminophen (TYLENOL) 325 mg tablet Take  by mouth every four (4) hours as needed for Pain. cholecalciferol (VITAMIN D3) 25 mcg (1,000 unit) cap Take  by mouth daily. meloxicam (MOBIC) 7.5 mg tablet Take 1 Tablet by mouth daily. (Patient not taking: Reported on 5/1/2023)    polyethylene glycol (MIRALAX) 17 gram/dose powder Take 17 g by mouth daily. (Patient not taking: Reported on 3/14/2022)    sildenafil citrate (Viagra) 50 mg tablet Take 1 Tab by mouth as needed for Other (ED). (Patient not taking: Reported on 3/10/2023)    albuterol (PROVENTIL HFA, VENTOLIN HFA, PROAIR HFA) 90 mcg/actuation inhaler Take 2 Puffs by inhalation every four (4) hours as needed for Wheezing. (Patient not taking: Reported on 5/1/2023)     No current facility-administered medications for this visit.            Physical Examination:    Visit Vitals  BP (!) 157/92 (BP 1 Location: Left upper arm, BP Patient Position: Sitting, BP Cuff Size: Adult)   Pulse 81   Temp 96.9 °F (36.1 °C) (Oral)   Resp 16   Ht 5' 7\" (1.702 m)   Wt 216 lb (98 kg)   SpO2 92%   BMI 33.83 kg/m²      General:  Alert, cooperative, no distress. Skin: Skin color, texture, turgor normal. Multiple skin tags below eyes. 8 mm irritated pedunculated on below L lateral canthus   Lymph nodes: Cervical, supraclavicular, and axillary nodes normal.   Neurologic: CNII-XII intact. Normal strength, sensation, and reflexes throughout. ASSESSMENT AND PLAN    1. Skin tag  Fol informed consent and sterile prep, base of tag anesthetized with 1% lidocaine and shaved with #11 scalpel. Hemostasis achieved with electrocautery  - REMOVAL OF SKIN TAGS              No orders of the defined types were placed in this encounter.           Vera Zimmer MD

## 2023-05-21 RX ORDER — SILDENAFIL 50 MG/1
50 TABLET, FILM COATED ORAL PRN
COMMUNITY
Start: 2020-10-19

## 2023-05-21 RX ORDER — TAMSULOSIN HYDROCHLORIDE 0.4 MG/1
0.4 CAPSULE ORAL DAILY
COMMUNITY

## 2023-05-21 RX ORDER — ATORVASTATIN CALCIUM 10 MG/1
10 TABLET, FILM COATED ORAL DAILY
COMMUNITY
Start: 2023-03-10

## 2023-05-21 RX ORDER — TROSPIUM CHLORIDE 20 MG/1
20 TABLET, FILM COATED ORAL
COMMUNITY

## 2023-05-21 RX ORDER — OMEPRAZOLE 20 MG/1
20 CAPSULE, DELAYED RELEASE ORAL DAILY
COMMUNITY
Start: 2023-03-10

## 2023-05-21 RX ORDER — POLYETHYLENE GLYCOL 3350 17 G/17G
17 POWDER, FOR SOLUTION ORAL DAILY
COMMUNITY
Start: 2021-07-22

## 2023-05-21 RX ORDER — HYDROCHLOROTHIAZIDE 25 MG/1
25 TABLET ORAL DAILY
COMMUNITY
Start: 2023-03-10

## 2023-05-21 RX ORDER — ACETAMINOPHEN 325 MG/1
TABLET ORAL EVERY 4 HOURS PRN
COMMUNITY

## 2023-05-21 RX ORDER — MELOXICAM 7.5 MG/1
7.5 TABLET ORAL DAILY
COMMUNITY
Start: 2023-03-10

## 2023-05-21 RX ORDER — METOPROLOL SUCCINATE 50 MG/1
TABLET, EXTENDED RELEASE ORAL DAILY
COMMUNITY

## 2023-05-21 RX ORDER — ALBUTEROL SULFATE 90 UG/1
2 AEROSOL, METERED RESPIRATORY (INHALATION) EVERY 4 HOURS PRN
COMMUNITY
Start: 2020-10-05

## 2023-11-21 ENCOUNTER — TELEPHONE (OUTPATIENT)
Dept: FAMILY MEDICINE CLINIC | Age: 73
End: 2023-11-21

## 2023-11-21 NOTE — TELEPHONE ENCOUNTER
Patient discharged for [facility] 11/21 with chest pains. He is scheduled for hospital follow up on Tuesday 11/28.

## 2023-11-22 NOTE — TELEPHONE ENCOUNTER
Care Transitions Initial Follow Up Call    Outreach made within 2 business days of discharge: Yes    Patient: Jonna Chrissy Patient : 1950   MRN: [id number]  Reason for Admission: No discharge information exists for this patient. Discharge Date:         Spoke with: 1st attempt. Left message on patient's VMB to return my call. Discharge department/facility: [facility].     Scheduled appointment with PCP within 7-14 days    Follow Up  Future Appointments   Date Time Provider [address] [place] [address]   2023  2:00 PM Ilan Escobedo MD [facility] BS KIM Mars RN

## 2023-11-27 NOTE — TELEPHONE ENCOUNTER
Care Transitions Initial Follow Up Call    Outreach made within 2 business days of discharge: No    Patient: Rhea Oh Patient : 1950   MRN: [id number]  Reason for Admission: No discharge information exists for this patient. Discharge Date:  23       Spoke with: patient and his wife    Discharge department/facility: [address] [place] [address] Interactive Patient Contact:  Was patient able to fill all prescriptions: No: None given per patient  Was patient instructed to bring all medications to the follow-up visit: Yes  Is patient taking all medications as directed in the discharge summary?  Yes  Does patient understand their discharge instructions: Yes  Does patient have questions or concerns that need addressed prior to 7-14 day follow up office visit: no    Scheduled appointment with PCP within 7-14 days    Follow Up  Future Appointments   Date Time Provider [address] [place] [address]   2023  2:00 PM Padmini Landeros MD [id number] [facility] Crystal Ruiz RN

## 2023-11-28 ENCOUNTER — OFFICE VISIT (OUTPATIENT)
Dept: FAMILY MEDICINE CLINIC | Age: 73
End: 2023-11-28

## 2023-11-28 VITALS
BODY MASS INDEX: 33.27 KG/M2 | HEART RATE: 66 BPM | HEIGHT: 67 IN | WEIGHT: 212 LBS | RESPIRATION RATE: 12 BRPM | OXYGEN SATURATION: 98 % | SYSTOLIC BLOOD PRESSURE: 135 MMHG | DIASTOLIC BLOOD PRESSURE: 85 MMHG | TEMPERATURE: 97.9 F

## 2023-11-28 DIAGNOSIS — Z09 HOSPITAL DISCHARGE FOLLOW-UP: Primary | ICD-10-CM

## 2023-11-28 RX ORDER — EZETIMIBE 10 MG/1
5 TABLET ORAL DAILY
COMMUNITY

## 2023-11-28 RX ORDER — CETIRIZINE HYDROCHLORIDE 10 MG/1
10 TABLET ORAL AS NEEDED
COMMUNITY

## 2023-11-28 ASSESSMENT — PATIENT HEALTH QUESTIONNAIRE - PHQ9
SUM OF ALL RESPONSES TO PHQ QUESTIONS 1-9: 0
SUM OF ALL RESPONSES TO PHQ QUESTIONS 1-9: 0
1. LITTLE INTEREST OR PLEASURE IN DOING THINGS: 0
2. FEELING DOWN, DEPRESSED OR HOPELESS: 0
SUM OF ALL RESPONSES TO PHQ QUESTIONS 1-9: 0
SUM OF ALL RESPONSES TO PHQ QUESTIONS 1-9: 0
SUM OF ALL RESPONSES TO PHQ9 QUESTIONS 1 & 2: 0

## 2024-07-19 ENCOUNTER — OFFICE VISIT (OUTPATIENT)
Dept: FAMILY MEDICINE CLINIC | Age: 74
End: 2024-07-19

## 2024-07-19 VITALS
RESPIRATION RATE: 16 BRPM | TEMPERATURE: 96.9 F | SYSTOLIC BLOOD PRESSURE: 137 MMHG | OXYGEN SATURATION: 97 % | WEIGHT: 209.38 LBS | BODY MASS INDEX: 32.86 KG/M2 | HEIGHT: 67 IN | HEART RATE: 65 BPM | DIASTOLIC BLOOD PRESSURE: 92 MMHG

## 2024-07-19 DIAGNOSIS — Z00.00 MEDICARE ANNUAL WELLNESS VISIT, SUBSEQUENT: Primary | ICD-10-CM

## 2024-07-19 DIAGNOSIS — E78.2 MIXED HYPERLIPIDEMIA: ICD-10-CM

## 2024-07-19 DIAGNOSIS — R41.3 MEMORY LOSS: ICD-10-CM

## 2024-07-19 DIAGNOSIS — Z85.46 HISTORY OF PROSTATE CANCER: ICD-10-CM

## 2024-07-19 PROBLEM — A15.0 PULMONARY TUBERCULOSIS: Status: ACTIVE | Noted: 2024-07-19

## 2024-07-19 PROBLEM — I10 ESSENTIAL (PRIMARY) HYPERTENSION: Status: ACTIVE | Noted: 2018-04-25

## 2024-07-19 PROBLEM — J30.9 ALLERGIC RHINITIS: Status: ACTIVE | Noted: 2024-07-19

## 2024-07-19 PROBLEM — G47.33 OBSTRUCTIVE SLEEP APNEA (ADULT) (PEDIATRIC): Status: ACTIVE | Noted: 2024-07-19

## 2024-07-19 PROBLEM — R76.11 NONSPECIFIC REACTION TO TUBERCULIN SKIN TEST: Status: ACTIVE | Noted: 2024-07-19

## 2024-07-19 SDOH — ECONOMIC STABILITY: HOUSING INSECURITY
IN THE LAST 12 MONTHS, WAS THERE A TIME WHEN YOU DID NOT HAVE A STEADY PLACE TO SLEEP OR SLEPT IN A SHELTER (INCLUDING NOW)?: NO

## 2024-07-19 SDOH — ECONOMIC STABILITY: FOOD INSECURITY: WITHIN THE PAST 12 MONTHS, YOU WORRIED THAT YOUR FOOD WOULD RUN OUT BEFORE YOU GOT MONEY TO BUY MORE.: NEVER TRUE

## 2024-07-19 SDOH — ECONOMIC STABILITY: FOOD INSECURITY: WITHIN THE PAST 12 MONTHS, THE FOOD YOU BOUGHT JUST DIDN'T LAST AND YOU DIDN'T HAVE MONEY TO GET MORE.: NEVER TRUE

## 2024-07-19 SDOH — ECONOMIC STABILITY: INCOME INSECURITY: HOW HARD IS IT FOR YOU TO PAY FOR THE VERY BASICS LIKE FOOD, HOUSING, MEDICAL CARE, AND HEATING?: SOMEWHAT HARD

## 2024-07-19 ASSESSMENT — PATIENT HEALTH QUESTIONNAIRE - PHQ9
SUM OF ALL RESPONSES TO PHQ9 QUESTIONS 1 & 2: 0
SUM OF ALL RESPONSES TO PHQ QUESTIONS 1-9: 0
2. FEELING DOWN, DEPRESSED OR HOPELESS: NOT AT ALL
SUM OF ALL RESPONSES TO PHQ QUESTIONS 1-9: 0
SUM OF ALL RESPONSES TO PHQ QUESTIONS 1-9: 0
1. LITTLE INTEREST OR PLEASURE IN DOING THINGS: NOT AT ALL
SUM OF ALL RESPONSES TO PHQ QUESTIONS 1-9: 0

## 2024-07-19 ASSESSMENT — LIFESTYLE VARIABLES
HOW MANY STANDARD DRINKS CONTAINING ALCOHOL DO YOU HAVE ON A TYPICAL DAY: PATIENT DOES NOT DRINK
HOW OFTEN DO YOU HAVE A DRINK CONTAINING ALCOHOL: NEVER

## 2024-07-20 LAB
ALBUMIN SERPL-MCNC: 3.3 G/DL (ref 3.5–5)
ALBUMIN/GLOB SERPL: 1 (ref 1.1–2.2)
ALP SERPL-CCNC: 79 U/L (ref 45–117)
ALT SERPL-CCNC: 26 U/L (ref 12–78)
ANION GAP SERPL CALC-SCNC: 7 MMOL/L (ref 5–15)
AST SERPL-CCNC: 21 U/L (ref 15–37)
BASOPHILS # BLD: 0 K/UL (ref 0–0.1)
BASOPHILS NFR BLD: 1 % (ref 0–1)
BILIRUB SERPL-MCNC: 0.6 MG/DL (ref 0.2–1)
BUN SERPL-MCNC: 14 MG/DL (ref 6–20)
BUN/CREAT SERPL: 13 (ref 12–20)
CALCIUM SERPL-MCNC: 8.8 MG/DL (ref 8.5–10.1)
CHLORIDE SERPL-SCNC: 107 MMOL/L (ref 97–108)
CHOLEST SERPL-MCNC: 138 MG/DL
CO2 SERPL-SCNC: 25 MMOL/L (ref 21–32)
CREAT SERPL-MCNC: 1.12 MG/DL (ref 0.7–1.3)
DIFFERENTIAL METHOD BLD: ABNORMAL
EOSINOPHIL # BLD: 0.3 K/UL (ref 0–0.4)
EOSINOPHIL NFR BLD: 7 % (ref 0–7)
ERYTHROCYTE [DISTWIDTH] IN BLOOD BY AUTOMATED COUNT: 13.5 % (ref 11.5–14.5)
GLOBULIN SER CALC-MCNC: 3.2 G/DL (ref 2–4)
GLUCOSE SERPL-MCNC: 141 MG/DL (ref 65–100)
HCT VFR BLD AUTO: 42.8 % (ref 36.6–50.3)
HDLC SERPL-MCNC: 46 MG/DL
HDLC SERPL: 3 (ref 0–5)
HGB BLD-MCNC: 14.2 G/DL (ref 12.1–17)
IMM GRANULOCYTES # BLD AUTO: 0 K/UL (ref 0–0.04)
IMM GRANULOCYTES NFR BLD AUTO: 0 % (ref 0–0.5)
LDLC SERPL CALC-MCNC: 72.4 MG/DL (ref 0–100)
LYMPHOCYTES # BLD: 1.3 K/UL (ref 0.8–3.5)
LYMPHOCYTES NFR BLD: 30 % (ref 12–49)
MCH RBC QN AUTO: 27.5 PG (ref 26–34)
MCHC RBC AUTO-ENTMCNC: 33.2 G/DL (ref 30–36.5)
MCV RBC AUTO: 82.8 FL (ref 80–99)
MONOCYTES # BLD: 0.6 K/UL (ref 0–1)
MONOCYTES NFR BLD: 14 % (ref 5–13)
NEUTS SEG # BLD: 2 K/UL (ref 1.8–8)
NEUTS SEG NFR BLD: 48 % (ref 32–75)
NRBC # BLD: 0 K/UL (ref 0–0.01)
NRBC BLD-RTO: 0 PER 100 WBC
PLATELET # BLD AUTO: 193 K/UL (ref 150–400)
PMV BLD AUTO: 10.1 FL (ref 8.9–12.9)
POTASSIUM SERPL-SCNC: 3.6 MMOL/L (ref 3.5–5.1)
PROT SERPL-MCNC: 6.5 G/DL (ref 6.4–8.2)
RBC # BLD AUTO: 5.17 M/UL (ref 4.1–5.7)
SODIUM SERPL-SCNC: 139 MMOL/L (ref 136–145)
TRIGL SERPL-MCNC: 98 MG/DL
TSH SERPL DL<=0.05 MIU/L-ACNC: 2.51 UIU/ML (ref 0.36–3.74)
VIT B12 SERPL-MCNC: 207 PG/ML (ref 193–986)
VLDLC SERPL CALC-MCNC: 19.6 MG/DL
WBC # BLD AUTO: 4.2 K/UL (ref 4.1–11.1)

## 2024-07-22 LAB
PSA FREE MFR SERPL: NORMAL %
PSA FREE SERPL-MCNC: <0.02 NG/ML
PSA SERPL-MCNC: <0.1 NG/ML (ref 0–4)

## 2024-08-16 ENCOUNTER — OFFICE VISIT (OUTPATIENT)
Dept: FAMILY MEDICINE CLINIC | Age: 74
End: 2024-08-16
Payer: MEDICARE

## 2024-08-16 VITALS
HEART RATE: 64 BPM | OXYGEN SATURATION: 98 % | DIASTOLIC BLOOD PRESSURE: 87 MMHG | TEMPERATURE: 97.5 F | RESPIRATION RATE: 16 BRPM | BODY MASS INDEX: 32.8 KG/M2 | WEIGHT: 209 LBS | SYSTOLIC BLOOD PRESSURE: 152 MMHG | HEIGHT: 67 IN

## 2024-08-16 DIAGNOSIS — G30.1 MODERATE LATE ONSET ALZHEIMER'S DEMENTIA WITHOUT BEHAVIORAL DISTURBANCE, PSYCHOTIC DISTURBANCE, MOOD DISTURBANCE, OR ANXIETY (HCC): Primary | ICD-10-CM

## 2024-08-16 DIAGNOSIS — F02.B0 MODERATE LATE ONSET ALZHEIMER'S DEMENTIA WITHOUT BEHAVIORAL DISTURBANCE, PSYCHOTIC DISTURBANCE, MOOD DISTURBANCE, OR ANXIETY (HCC): Primary | ICD-10-CM

## 2024-08-16 PROCEDURE — 3077F SYST BP >= 140 MM HG: CPT | Performed by: FAMILY MEDICINE

## 2024-08-16 PROCEDURE — 99214 OFFICE O/P EST MOD 30 MIN: CPT | Performed by: FAMILY MEDICINE

## 2024-08-16 PROCEDURE — 1123F ACP DISCUSS/DSCN MKR DOCD: CPT | Performed by: FAMILY MEDICINE

## 2024-08-16 PROCEDURE — 3079F DIAST BP 80-89 MM HG: CPT | Performed by: FAMILY MEDICINE

## 2024-08-16 RX ORDER — DONEPEZIL HYDROCHLORIDE 5 MG/1
5 TABLET, FILM COATED ORAL NIGHTLY
Qty: 90 TABLET | Refills: 1 | Status: SHIPPED | OUTPATIENT
Start: 2024-08-16

## 2024-08-16 ASSESSMENT — PATIENT HEALTH QUESTIONNAIRE - PHQ9
SUM OF ALL RESPONSES TO PHQ QUESTIONS 1-9: 0
SUM OF ALL RESPONSES TO PHQ9 QUESTIONS 1 & 2: 0
SUM OF ALL RESPONSES TO PHQ QUESTIONS 1-9: 0
1. LITTLE INTEREST OR PLEASURE IN DOING THINGS: NOT AT ALL
2. FEELING DOWN, DEPRESSED OR HOPELESS: NOT AT ALL

## 2024-08-16 NOTE — PROGRESS NOTES
\"Have you been to the ER, urgent care clinic since your last visit?  Hospitalized since your last visit?\"    NO    “Have you seen or consulted any other health care providers outside of [facility] since your last visit?”    NO      8/16/2024      Chief Complaint   Patient presents with    Memory Loss         History of Present Illness:         is a 73 y.o. male to do MOCA. Here with wife who has noted significant memory problems over last couple of years. No problems with ADLs or IADLs.      Allergies   Allergen Reactions    Terazosin Dizziness or Vertigo and Itching     Other Reaction(s): Dizziness       Current Outpatient Medications   Medication Sig Dispense Refill    donepezil (ARICEPT) 5 MG tablet Take 1 tablet by mouth nightly 90 tablet 1    ezetimibe (ZETIA) 10 MG tablet Take 0.5 tablets by mouth daily      cetirizine (ZYRTEC) 10 MG tablet Take 1 tablet by mouth as needed for Allergies      acetaminophen (TYLENOL) 325 MG tablet Take by mouth every 4 hours as needed      atorvastatin (LIPITOR) 10 MG tablet Take 1 tablet by mouth daily      vitamin D 25 MCG (1000 UT) CAPS Take by mouth daily      hydroCHLOROthiazide (HYDRODIURIL) 25 MG tablet Take 1 tablet by mouth daily      metoprolol succinate (TOPROL XL) 50 MG extended release tablet Take by mouth daily      omeprazole (PRILOSEC) 20 MG delayed release capsule Take 1 capsule by mouth daily      tamsulosin (FLOMAX) 0.4 MG capsule Take 1 capsule by mouth daily      trospium (SANCTURA) 20 MG tablet Take 1 tablet by mouth 3 times daily (before meals)      albuterol sulfate HFA (PROVENTIL;VENTOLIN;PROAIR) 108 (90 Base) MCG/ACT inhaler Inhale 2 puffs into the lungs every 4 hours as needed (Patient not taking: Reported on 11/28/2023)      meloxicam (MOBIC) 7.5 MG tablet Take 1 tablet by mouth daily (Patient not taking: Reported on 11/28/2023)      polyethylene glycol (GLYCOLAX) 17 GM/SCOOP powder Take 17 g by mouth daily (Patient not taking:

## 2025-03-28 ENCOUNTER — COMMUNITY OUTREACH (OUTPATIENT)
Dept: FAMILY MEDICINE CLINIC | Age: 75
End: 2025-03-28

## 2025-06-16 ENCOUNTER — TELEPHONE (OUTPATIENT)
Dept: FAMILY MEDICINE CLINIC | Age: 75
End: 2025-06-16

## 2025-06-16 NOTE — TELEPHONE ENCOUNTER
Patient was discharged from hospital on 6/9. He was admitted for chest pains and SOB.   F/u scheduled for 6/17.